# Patient Record
Sex: FEMALE | Race: WHITE | NOT HISPANIC OR LATINO | Employment: OTHER | URBAN - METROPOLITAN AREA
[De-identification: names, ages, dates, MRNs, and addresses within clinical notes are randomized per-mention and may not be internally consistent; named-entity substitution may affect disease eponyms.]

---

## 2022-07-08 ENCOUNTER — APPOINTMENT (OUTPATIENT)
Dept: CARDIOLOGY | Facility: HOSPITAL | Age: 62
End: 2022-07-08

## 2022-07-08 ENCOUNTER — APPOINTMENT (OUTPATIENT)
Dept: GENERAL RADIOLOGY | Facility: HOSPITAL | Age: 62
End: 2022-07-08

## 2022-07-08 ENCOUNTER — HOSPITAL ENCOUNTER (EMERGENCY)
Facility: HOSPITAL | Age: 62
Discharge: HOME | End: 2022-07-08
Attending: EMERGENCY MEDICINE | Admitting: EMERGENCY MEDICINE

## 2022-07-08 VITALS
TEMPERATURE: 98.9 F | DIASTOLIC BLOOD PRESSURE: 96 MMHG | SYSTOLIC BLOOD PRESSURE: 125 MMHG | HEIGHT: 63 IN | OXYGEN SATURATION: 97 % | HEART RATE: 130 BPM | BODY MASS INDEX: 24.8 KG/M2 | WEIGHT: 140 LBS | RESPIRATION RATE: 20 BRPM

## 2022-07-08 DIAGNOSIS — B34.8 RHINOVIRUS INFECTION: Primary | ICD-10-CM

## 2022-07-08 DIAGNOSIS — I47.1 NARROW COMPLEX TACHYCARDIA: ICD-10-CM

## 2022-07-08 LAB
ALBUMIN SERPL-MCNC: 4.4 G/DL (ref 3.5–5.2)
ALBUMIN/GLOB SERPL: 1.7 G/DL
ALP SERPL-CCNC: 67 U/L (ref 39–117)
ALT SERPL W P-5'-P-CCNC: 12 U/L (ref 1–33)
ANION GAP SERPL CALCULATED.3IONS-SCNC: 12.1 MMOL/L (ref 5–15)
AST SERPL-CCNC: 16 U/L (ref 1–32)
B PARAPERT DNA SPEC QL NAA+PROBE: NOT DETECTED
B PERT DNA SPEC QL NAA+PROBE: NOT DETECTED
BASOPHILS # BLD AUTO: 0.05 10*3/MM3 (ref 0–0.2)
BASOPHILS NFR BLD AUTO: 0.5 % (ref 0–1.5)
BILIRUB SERPL-MCNC: 0.4 MG/DL (ref 0–1.2)
BUN SERPL-MCNC: 8 MG/DL (ref 8–23)
BUN/CREAT SERPL: 12.5 (ref 7–25)
C PNEUM DNA NPH QL NAA+NON-PROBE: NOT DETECTED
CALCIUM SPEC-SCNC: 9.2 MG/DL (ref 8.6–10.5)
CHLORIDE SERPL-SCNC: 97 MMOL/L (ref 98–107)
CO2 SERPL-SCNC: 23.9 MMOL/L (ref 22–29)
CREAT SERPL-MCNC: 0.64 MG/DL (ref 0.57–1)
DEPRECATED RDW RBC AUTO: 38.1 FL (ref 37–54)
EGFRCR SERPLBLD CKD-EPI 2021: 98.8 ML/MIN/1.73
EOSINOPHIL # BLD AUTO: 0.05 10*3/MM3 (ref 0–0.4)
EOSINOPHIL NFR BLD AUTO: 0.5 % (ref 0.3–6.2)
ERYTHROCYTE [DISTWIDTH] IN BLOOD BY AUTOMATED COUNT: 12.3 % (ref 12.3–15.4)
FLUAV SUBTYP SPEC NAA+PROBE: NOT DETECTED
FLUBV RNA ISLT QL NAA+PROBE: NOT DETECTED
GLOBULIN UR ELPH-MCNC: 2.6 GM/DL
GLUCOSE SERPL-MCNC: 113 MG/DL (ref 65–99)
HADV DNA SPEC NAA+PROBE: NOT DETECTED
HCOV 229E RNA SPEC QL NAA+PROBE: NOT DETECTED
HCOV HKU1 RNA SPEC QL NAA+PROBE: NOT DETECTED
HCOV NL63 RNA SPEC QL NAA+PROBE: NOT DETECTED
HCOV OC43 RNA SPEC QL NAA+PROBE: NOT DETECTED
HCT VFR BLD AUTO: 38.8 % (ref 34–46.6)
HGB BLD-MCNC: 13.1 G/DL (ref 12–15.9)
HMPV RNA NPH QL NAA+NON-PROBE: NOT DETECTED
HOLD SPECIMEN: NORMAL
HPIV1 RNA ISLT QL NAA+PROBE: NOT DETECTED
HPIV2 RNA SPEC QL NAA+PROBE: NOT DETECTED
HPIV3 RNA NPH QL NAA+PROBE: NOT DETECTED
HPIV4 P GENE NPH QL NAA+PROBE: NOT DETECTED
IMM GRANULOCYTES # BLD AUTO: 0.03 10*3/MM3 (ref 0–0.05)
IMM GRANULOCYTES NFR BLD AUTO: 0.3 % (ref 0–0.5)
LYMPHOCYTES # BLD AUTO: 0.78 10*3/MM3 (ref 0.7–3.1)
LYMPHOCYTES NFR BLD AUTO: 7.9 % (ref 19.6–45.3)
M PNEUMO IGG SER IA-ACNC: NOT DETECTED
MAGNESIUM SERPL-MCNC: 2.1 MG/DL (ref 1.6–2.4)
MCH RBC QN AUTO: 29.4 PG (ref 26.6–33)
MCHC RBC AUTO-ENTMCNC: 33.8 G/DL (ref 31.5–35.7)
MCV RBC AUTO: 87 FL (ref 79–97)
MONOCYTES # BLD AUTO: 0.57 10*3/MM3 (ref 0.1–0.9)
MONOCYTES NFR BLD AUTO: 5.8 % (ref 5–12)
NEUTROPHILS NFR BLD AUTO: 8.34 10*3/MM3 (ref 1.7–7)
NEUTROPHILS NFR BLD AUTO: 85 % (ref 42.7–76)
NRBC BLD AUTO-RTO: 0 /100 WBC (ref 0–0.2)
NT-PROBNP SERPL-MCNC: 310 PG/ML (ref 0–900)
PLATELET # BLD AUTO: 196 10*3/MM3 (ref 140–450)
PMV BLD AUTO: 11.2 FL (ref 6–12)
POTASSIUM SERPL-SCNC: 3.8 MMOL/L (ref 3.5–5.2)
PROT SERPL-MCNC: 7 G/DL (ref 6–8.5)
QT INTERVAL: 311 MS
QT INTERVAL: 360 MS
RBC # BLD AUTO: 4.46 10*6/MM3 (ref 3.77–5.28)
RHINOVIRUS RNA SPEC NAA+PROBE: DETECTED
RSV RNA NPH QL NAA+NON-PROBE: NOT DETECTED
SARS-COV-2 RNA NPH QL NAA+NON-PROBE: NOT DETECTED
SODIUM SERPL-SCNC: 133 MMOL/L (ref 136–145)
TROPONIN T SERPL-MCNC: <0.01 NG/ML (ref 0–0.03)
WBC NRBC COR # BLD: 9.82 10*3/MM3 (ref 3.4–10.8)
WHOLE BLOOD HOLD COAG: NORMAL
WHOLE BLOOD HOLD SPECIMEN: NORMAL

## 2022-07-08 PROCEDURE — 96374 THER/PROPH/DIAG INJ IV PUSH: CPT

## 2022-07-08 PROCEDURE — 93005 ELECTROCARDIOGRAM TRACING: CPT | Performed by: EMERGENCY MEDICINE

## 2022-07-08 PROCEDURE — 84484 ASSAY OF TROPONIN QUANT: CPT | Performed by: EMERGENCY MEDICINE

## 2022-07-08 PROCEDURE — 80053 COMPREHEN METABOLIC PANEL: CPT | Performed by: EMERGENCY MEDICINE

## 2022-07-08 PROCEDURE — 0202U NFCT DS 22 TRGT SARS-COV-2: CPT | Performed by: EMERGENCY MEDICINE

## 2022-07-08 PROCEDURE — 93005 ELECTROCARDIOGRAM TRACING: CPT

## 2022-07-08 PROCEDURE — 93246 EXT ECG>7D<15D RECORDING: CPT

## 2022-07-08 PROCEDURE — 85025 COMPLETE CBC W/AUTO DIFF WBC: CPT | Performed by: EMERGENCY MEDICINE

## 2022-07-08 PROCEDURE — 83735 ASSAY OF MAGNESIUM: CPT | Performed by: EMERGENCY MEDICINE

## 2022-07-08 PROCEDURE — 83880 ASSAY OF NATRIURETIC PEPTIDE: CPT | Performed by: EMERGENCY MEDICINE

## 2022-07-08 PROCEDURE — 25010000002 ONDANSETRON PER 1 MG: Performed by: PHYSICIAN ASSISTANT

## 2022-07-08 PROCEDURE — 96376 TX/PRO/DX INJ SAME DRUG ADON: CPT

## 2022-07-08 PROCEDURE — 93010 ELECTROCARDIOGRAM REPORT: CPT | Performed by: INTERNAL MEDICINE

## 2022-07-08 PROCEDURE — 99284 EMERGENCY DEPT VISIT MOD MDM: CPT

## 2022-07-08 PROCEDURE — 96375 TX/PRO/DX INJ NEW DRUG ADDON: CPT

## 2022-07-08 PROCEDURE — 71045 X-RAY EXAM CHEST 1 VIEW: CPT

## 2022-07-08 RX ORDER — ONDANSETRON 2 MG/ML
4 INJECTION INTRAMUSCULAR; INTRAVENOUS ONCE
Status: COMPLETED | OUTPATIENT
Start: 2022-07-08 | End: 2022-07-08

## 2022-07-08 RX ORDER — ACETAMINOPHEN, ASPIRIN AND CAFFEINE 250; 250; 65 MG/1; MG/1; MG/1
1 TABLET, FILM COATED ORAL EVERY 6 HOURS PRN
COMMUNITY

## 2022-07-08 RX ORDER — METOPROLOL SUCCINATE 25 MG/1
12.5 TABLET, EXTENDED RELEASE ORAL DAILY
Qty: 15 TABLET | Refills: 0 | Status: SHIPPED | OUTPATIENT
Start: 2022-07-08 | End: 2022-07-11 | Stop reason: SDUPTHER

## 2022-07-08 RX ORDER — SODIUM CHLORIDE 0.9 % (FLUSH) 0.9 %
10 SYRINGE (ML) INJECTION AS NEEDED
Status: DISCONTINUED | OUTPATIENT
Start: 2022-07-08 | End: 2022-07-08 | Stop reason: HOSPADM

## 2022-07-08 RX ADMIN — SODIUM CHLORIDE 1000 ML: 9 INJECTION, SOLUTION INTRAVENOUS at 10:52

## 2022-07-08 RX ADMIN — Medication 10 ML: at 10:37

## 2022-07-08 RX ADMIN — METOPROLOL TARTRATE 25 MG: 25 TABLET, FILM COATED ORAL at 11:28

## 2022-07-08 RX ADMIN — METOROPROLOL TARTRATE 5 MG: 5 INJECTION, SOLUTION INTRAVENOUS at 14:07

## 2022-07-08 RX ADMIN — METOPROLOL TARTRATE 5 MG: 1 INJECTION, SOLUTION INTRAVENOUS at 13:01

## 2022-07-08 RX ADMIN — ONDANSETRON 4 MG: 2 INJECTION INTRAMUSCULAR; INTRAVENOUS at 10:52

## 2022-07-08 NOTE — ED NOTES
Pt arrived via PV with c/o palpitations in chest that have been intermittent over the last two weeks with more frequent episodes starting last night. Pt notices more fluttering sensation at rest. Pt also reports she was to be seen at pcp today for cough, headache, and neck pain and was told to come to ER for cardiac workup. Pt denies any cardiac hx.

## 2022-07-08 NOTE — ED NOTES
Pt to ED with c/o mild HA, nausea, HR fluttering, pts HR does elevate to 150's since arrival, states has mild cough with yellow sputum, home neg covid test yesterday     Pt wearing face mask during their stay in ER. This RN wore appropriate ppe while providing patient care.

## 2022-07-08 NOTE — ED PROVIDER NOTES
" EMERGENCY DEPARTMENT ENCOUNTER    Room Number:  22/22  Date of encounter:  7/8/2022  PCP: Provider, No Known  Historian: Patient      HPI:  Chief Complaint: Cough, palpitations, headache and nausea  A complete HPI/ROS/PMH/PSH/SH/FH are unobtainable due to: N/A    Context: Jasmin Ureña is a 62 y.o. female who presents to the ED c/o the above symptoms.  Symptoms started approximately 4 to 5 days ago with a sore throat that has since resolved.  Now she has a cough and congestion-cough is productive of yellow sputum.  No fevers or chills.  No myalgias or arthralgias.  Heart has been racing intermittently which causes him shortness of breath.  She also has a headache and nausea.  Symptoms were gradual in onset, constant, \"aching\", moderate in intensity.  There are no aggravating relieving factors.    Of note-patient has not been vaccinated for COVID-19 or influenza.  She states she was diagnosed with COVID in August 2021.        The patient was placed in a mask in triage, hand hygiene was performed before and after my interaction with the patient.  I wore a mask, safety glasses and gloves during my entire interaction with the patient.    PAST MEDICAL HISTORY  Active Ambulatory Problems     Diagnosis Date Noted   • No Active Ambulatory Problems     Resolved Ambulatory Problems     Diagnosis Date Noted   • No Resolved Ambulatory Problems     Past Medical History:   Diagnosis Date   • Migraine          PAST SURGICAL HISTORY  History reviewed. No pertinent surgical history.      FAMILY HISTORY  History reviewed. No pertinent family history.      SOCIAL HISTORY  Social History     Socioeconomic History   • Marital status:    Tobacco Use   • Smoking status: Never Smoker   Substance and Sexual Activity   • Alcohol use: Yes     Comment: occasionally   • Drug use: Never   • Sexual activity: Defer         ALLERGIES  Patient has no known allergies.        REVIEW OF SYSTEMS  Review of Systems   Constitutional: Negative " for chills and fever.   HENT: Positive for congestion and sore throat (Resolved).    Respiratory: Positive for cough.    Cardiovascular: Positive for palpitations. Negative for chest pain.   Gastrointestinal: Negative for abdominal pain, diarrhea and vomiting.   Neurological: Positive for headaches.        All systems reviewed and negative except for those discussed in HPI.       PHYSICAL EXAM    I have reviewed the triage vital signs and nursing notes.    ED Triage Vitals   Temp Heart Rate Resp BP SpO2   07/08/22 0929 07/08/22 0929 07/08/22 0929 07/08/22 1039 07/08/22 0929   98.9 °F (37.2 °C) 110 18 128/84 98 %      Temp src Heart Rate Source Patient Position BP Location FiO2 (%)   07/08/22 0929 07/08/22 1039 -- -- --   Tympanic Monitor          Physical Exam   Constitutional: Pt. is oriented to person, place, and time and well-developed, well-nourished, and in no distress.   HENT: Normocephalic and atraumatic.   Neck: Normal range of motion. Neck supple. No JVD present.   Cardiovascular: Normal rate, regular rhythm and normal heart sounds. No murmur heard.  Pulmonary/Chest: Effort normal and breath sounds normal. No stridor. No respiratory distress. No wheezes, no rales.   Abdominal: Soft. Bowel sounds are normal. No distension. There is no tenderness. There is no rebound and no guarding.   Musculoskeletal: Normal range of motion. No edema, tenderness or deformity.   Neurological: Pt. is alert and oriented to person, place, and time.  She has no focal neurologic deficits  Skin: Skin is warm and dry. No rash noted. Pt. is not diaphoretic. No erythema.   Psychiatric: Mood, affect normal.  She is pleasant and cooperative.  Nursing note and vitals reviewed.        LAB RESULTS  Recent Results (from the past 24 hour(s))   ECG 12 Lead    Collection Time: 07/08/22  9:50 AM   Result Value Ref Range    QT Interval 360 ms   Comprehensive Metabolic Panel    Collection Time: 07/08/22 10:37 AM    Specimen: Blood   Result Value  Ref Range    Glucose 113 (H) 65 - 99 mg/dL    BUN 8 8 - 23 mg/dL    Creatinine 0.64 0.57 - 1.00 mg/dL    Sodium 133 (L) 136 - 145 mmol/L    Potassium 3.8 3.5 - 5.2 mmol/L    Chloride 97 (L) 98 - 107 mmol/L    CO2 23.9 22.0 - 29.0 mmol/L    Calcium 9.2 8.6 - 10.5 mg/dL    Total Protein 7.0 6.0 - 8.5 g/dL    Albumin 4.40 3.50 - 5.20 g/dL    ALT (SGPT) 12 1 - 33 U/L    AST (SGOT) 16 1 - 32 U/L    Alkaline Phosphatase 67 39 - 117 U/L    Total Bilirubin 0.4 0.0 - 1.2 mg/dL    Globulin 2.6 gm/dL    A/G Ratio 1.7 g/dL    BUN/Creatinine Ratio 12.5 7.0 - 25.0    Anion Gap 12.1 5.0 - 15.0 mmol/L    eGFR 98.8 >60.0 mL/min/1.73   Magnesium    Collection Time: 07/08/22 10:37 AM    Specimen: Blood   Result Value Ref Range    Magnesium 2.1 1.6 - 2.4 mg/dL   Troponin    Collection Time: 07/08/22 10:37 AM    Specimen: Blood   Result Value Ref Range    Troponin T <0.010 0.000 - 0.030 ng/mL   Green Top (Gel)    Collection Time: 07/08/22 10:37 AM   Result Value Ref Range    Extra Tube Hold for add-ons.    Lavender Top    Collection Time: 07/08/22 10:37 AM   Result Value Ref Range    Extra Tube hold for add-on    Light Blue Top    Collection Time: 07/08/22 10:37 AM   Result Value Ref Range    Extra Tube Hold for add-ons.    CBC Auto Differential    Collection Time: 07/08/22 10:37 AM    Specimen: Blood   Result Value Ref Range    WBC 9.82 3.40 - 10.80 10*3/mm3    RBC 4.46 3.77 - 5.28 10*6/mm3    Hemoglobin 13.1 12.0 - 15.9 g/dL    Hematocrit 38.8 34.0 - 46.6 %    MCV 87.0 79.0 - 97.0 fL    MCH 29.4 26.6 - 33.0 pg    MCHC 33.8 31.5 - 35.7 g/dL    RDW 12.3 12.3 - 15.4 %    RDW-SD 38.1 37.0 - 54.0 fl    MPV 11.2 6.0 - 12.0 fL    Platelets 196 140 - 450 10*3/mm3    Neutrophil % 85.0 (H) 42.7 - 76.0 %    Lymphocyte % 7.9 (L) 19.6 - 45.3 %    Monocyte % 5.8 5.0 - 12.0 %    Eosinophil % 0.5 0.3 - 6.2 %    Basophil % 0.5 0.0 - 1.5 %    Immature Grans % 0.3 0.0 - 0.5 %    Neutrophils, Absolute 8.34 (H) 1.70 - 7.00 10*3/mm3    Lymphocytes,  Absolute 0.78 0.70 - 3.10 10*3/mm3    Monocytes, Absolute 0.57 0.10 - 0.90 10*3/mm3    Eosinophils, Absolute 0.05 0.00 - 0.40 10*3/mm3    Basophils, Absolute 0.05 0.00 - 0.20 10*3/mm3    Immature Grans, Absolute 0.03 0.00 - 0.05 10*3/mm3    nRBC 0.0 0.0 - 0.2 /100 WBC   BNP    Collection Time: 07/08/22 10:37 AM    Specimen: Blood   Result Value Ref Range    proBNP 310.0 0.0 - 900.0 pg/mL   Respiratory Panel PCR w/COVID-19(SARS-CoV-2) BORIS/LUIS ALBERTO/EILEEN/PAD/COR/MAD/SUKH In-House, NP Swab in UTM/VTM, 3-4 HR TAT - Swab, Nasopharynx    Collection Time: 07/08/22 11:29 AM    Specimen: Nasopharynx; Swab   Result Value Ref Range    ADENOVIRUS, PCR Not Detected Not Detected    Coronavirus 229E Not Detected Not Detected    Coronavirus HKU1 Not Detected Not Detected    Coronavirus NL63 Not Detected Not Detected    Coronavirus OC43 Not Detected Not Detected    COVID19 Not Detected Not Detected - Ref. Range    Human Metapneumovirus Not Detected Not Detected    Human Rhinovirus/Enterovirus Detected (A) Not Detected    Influenza A PCR Not Detected Not Detected    Influenza B PCR Not Detected Not Detected    Parainfluenza Virus 1 Not Detected Not Detected    Parainfluenza Virus 2 Not Detected Not Detected    Parainfluenza Virus 3 Not Detected Not Detected    Parainfluenza Virus 4 Not Detected Not Detected    RSV, PCR Not Detected Not Detected    Bordetella pertussis pcr Not Detected Not Detected    Bordetella parapertussis PCR Not Detected Not Detected    Chlamydophila pneumoniae PCR Not Detected Not Detected    Mycoplasma pneumo by PCR Not Detected Not Detected   ECG 12 Lead    Collection Time: 07/08/22 12:23 PM   Result Value Ref Range    QT Interval 311 ms       Ordered the above labs and independently reviewed the results.        RADIOLOGY  XR Chest 1 View    Result Date: 7/8/2022  XR CHEST 1 VW-  Clinical: Dysrhythmia  COMPARISON: None  FINDINGS: Cardiac size within normal limits. No mediastinal or hilar abnormality. Lungs clear.   CONCLUSION: Normal chest  This report was finalized on 7/8/2022 10:00 AM by Dr. Juice Price M.D.        I ordered the above noted radiological studies. Reviewed by me and discussed with radiologist.  See dictation for official radiology interpretation.      PROCEDURES    Procedures      MEDICATIONS GIVEN IN ER    Medications   sodium chloride 0.9 % flush 10 mL (10 mL Intravenous Given 7/8/22 1037)   sodium chloride 0.9 % bolus 1,000 mL (0 mL Intravenous Stopped 7/8/22 1129)   ondansetron (ZOFRAN) injection 4 mg (4 mg Intravenous Given 7/8/22 1052)   metoprolol tartrate (LOPRESSOR) tablet 25 mg (25 mg Oral Given 7/8/22 1128)   metoprolol tartrate (LOPRESSOR) injection 5 mg (5 mg Intravenous Given 7/8/22 1301)   metoprolol tartrate (LOPRESSOR) injection 5 mg (5 mg Intravenous Given 7/8/22 1407)         PROGRESS, DATA ANALYSIS, CONSULTS, AND MEDICAL DECISION MAKING    Any/all labs have been independently reviewed by me.  Any/all radiology studies have been reviewed by me and discussed with radiologist dictating the report.   EKG's independently viewed and interpreted by me.  Discussion below represents my analysis of pertinent findings related to patient's condition, differential diagnosis, treatment plan and final disposition.    Number of Diagnoses or Management Options     Amount and/or Complexity of Data Reviewed  Clinical lab tests:  Yes  Tests in the radiology section of CPT®:  Yes  Tests in the medicine section of CPT®:  Yes  Review and summarize past medical records:  (Yes-no recent ER/hospitalizations at [facility])  Independent visualization of images, tracings, or specimens: (Yes-see below)      ED Course as of 07/08/22 1426   Fri Jul 08, 2022   1105 EKG performed today at 915 interpreted by me shows normal sinus rhythm with a heart rate of 97 bpm.  There is left atrial enlargement..  VA intervals are normal.  There are anterior Q waves and nonspecific ST-T changes.  There are no prior EKGs  available for comparison. [WC]   1105 Chest x-ray independently visualized by me and discussed with/interpreted by Dr. Price (radiology)-there are no acute processes identified.  For official interpretation, see dictated report. [WC]   1106 Differential diagnosis includes but is not limited to: Pulmonary embolism, aortic dissection, acute coronary syndrome/STEMI, pneumonia/CHF/COPD exacerbation, pneumothorax, pleurisy, bronchitis, gastroesophageal reflux, referred pain, traumatic injury/rib fractures, etc. [WC]   1106 Cardiac monitor revealed normal sinus as interpreted by me.  Cardiac monitoring was ordered secondary to the patient's history of chest discomfort and to monitor the patient for dysrhythmia.    She is having episodes of narrow complex tachycardia with a heart rate in the 140s to 150s on the monitor. [WC]   1143 Troponin T: <0.010  Normal [WC]   1143 CBC is unremarkable [WC]   1225 CMP is unremarkable [WC]   1225 Repeat EKG performed at 1223 and interpreted by me shows narrow complex tachycardia (atrial flutter?)  With a heart rate of 145 bpm.  Q waves noted in V1.  Nonspecific ST-T changes are present. [WC]   1245 proBNP: 310.0 [WC]   1405 Human Rhinovirus/Enterovirus(!): Detected [WC]   1424 Patient's rate has settled down and she feels much better.  I am going to prescribe her a low-dose of metoprolol.  Zio patch will be placed-she will need to follow-up with cardiology as an outpatient. [WC]      ED Course User Index  [WC] Edwardo Eduardo MD       AS OF 14:26 EDT VITALS:    BP - 125/96  HR - (!) 130  TEMP - 98.9 °F (37.2 °C) (Tympanic)  02 SATS - 97%        DIAGNOSIS  Final diagnoses:   Rhinovirus infection   Narrow complex tachycardia (HCC)         DISPOSITION  Discharged           Edwardo Eduardo MD  07/08/22 1426

## 2022-07-11 ENCOUNTER — OFFICE VISIT (OUTPATIENT)
Dept: INTERNAL MEDICINE | Facility: CLINIC | Age: 62
End: 2022-07-11

## 2022-07-11 VITALS
DIASTOLIC BLOOD PRESSURE: 74 MMHG | HEART RATE: 78 BPM | BODY MASS INDEX: 24.98 KG/M2 | WEIGHT: 141 LBS | RESPIRATION RATE: 18 BRPM | HEIGHT: 63 IN | SYSTOLIC BLOOD PRESSURE: 134 MMHG | TEMPERATURE: 97.5 F | OXYGEN SATURATION: 98 %

## 2022-07-11 DIAGNOSIS — G43.009 MIGRAINE WITHOUT AURA AND WITHOUT STATUS MIGRAINOSUS, NOT INTRACTABLE: ICD-10-CM

## 2022-07-11 DIAGNOSIS — Z11.59 NEED FOR HEPATITIS C SCREENING TEST: ICD-10-CM

## 2022-07-11 DIAGNOSIS — Z13.6 ENCOUNTER FOR LIPID SCREENING FOR CARDIOVASCULAR DISEASE: ICD-10-CM

## 2022-07-11 DIAGNOSIS — Z91.89 AT RISK FOR SLEEP APNEA: ICD-10-CM

## 2022-07-11 DIAGNOSIS — R00.2 PALPITATIONS: ICD-10-CM

## 2022-07-11 DIAGNOSIS — Z13.220 ENCOUNTER FOR LIPID SCREENING FOR CARDIOVASCULAR DISEASE: ICD-10-CM

## 2022-07-11 DIAGNOSIS — Z76.89 ENCOUNTER TO ESTABLISH CARE: Primary | ICD-10-CM

## 2022-07-11 DIAGNOSIS — Z00.00 HEALTHCARE MAINTENANCE: ICD-10-CM

## 2022-07-11 DIAGNOSIS — Z12.31 SCREENING MAMMOGRAM, ENCOUNTER FOR: ICD-10-CM

## 2022-07-11 PROCEDURE — 99204 OFFICE O/P NEW MOD 45 MIN: CPT | Performed by: NURSE PRACTITIONER

## 2022-07-11 RX ORDER — METOPROLOL SUCCINATE 25 MG/1
12.5 TABLET, EXTENDED RELEASE ORAL DAILY
Qty: 45 TABLET | Refills: 0 | Status: SHIPPED | OUTPATIENT
Start: 2022-07-11 | End: 2022-07-27 | Stop reason: SDUPTHER

## 2022-07-11 NOTE — PROGRESS NOTES
"Chief Complaint  Follow-up (Pt present to us for a wellness check as well as a hospital follow up )    Subjective        Jasmin Ureña presents to [facility]  Patient presents to [facility] care today.  This is a 62-year-old female.  Her former PCP was Dr. Singh with [facility].  This patient is new to me.  She is a retired respiratory therapist.    She was in the ER on 7/8/2022, 3 days ago.  She had been having some respiratory symptoms that were gradually improving but has been having palpitations.  She presented for evaluation of the palpitations.  She was positive for rhinovirus but reports that her respiratory symptoms have since resolved.  No shortness of breath.  She had an EKG and a Zio patch was placed which she is wearing now.  She plans to see cardiology outpatient per the instructions from the ER but does not have the correct phone number and would like me to place the referral today so that she is contacted to schedule.    She has been monitoring her home blood pressures since the ER visit.  They are running in the 1 teens to 130s over 70s.  Her heart rate at home has been running between 60s and 80s.    She is a never smoker and drinks alcohol socially, not in excess.    She sees no specialists other than a gynecologist.  It has been a few years since she saw GYN.  She is due for screening mammogram.    She endorses a family history significant for CAD and sleep apnea.  She has a history of migraines but lately she has been getting migraines that begin at night.  She wonders whether this may be related to sleep apnea.    Otherwise she denies development of other new issues today.      Objective   Vital Signs:  /74 Comment: Manual recheck by APRN  Pulse 78   Temp 97.5 °F (36.4 °C)   Resp 18   Ht 160 cm (63\")   Wt 64 kg (141 lb)   SpO2 98%   BMI 24.98 kg/m²   Estimated body mass index is 24.98 kg/m² as calculated from the following:    Height as of this " "encounter: 160 cm (63\").    Weight as of this encounter: 64 kg (141 lb).    BMI is within normal parameters. No other follow-up for BMI required.      Physical Exam  Vitals and nursing note reviewed.   Constitutional:       General: She is not in acute distress.     Appearance: Normal appearance. She is well-developed. She is not ill-appearing, toxic-appearing or diaphoretic.   HENT:      Head: Normocephalic and atraumatic.      Right Ear: Tympanic membrane, ear canal and external ear normal.      Left Ear: Tympanic membrane, ear canal and external ear normal.   Eyes:      Pupils: Pupils are equal, round, and reactive to light.   Neck:      Vascular: No carotid bruit.   Cardiovascular:      Rate and Rhythm: Normal rate and regular rhythm.      Pulses: Normal pulses.      Heart sounds: Normal heart sounds.      Comments: No peripheral edema  Pulmonary:      Effort: Pulmonary effort is normal. No respiratory distress.      Breath sounds: Normal breath sounds. No stridor. No wheezing, rhonchi or rales.   Chest:      Chest wall: No tenderness.   Abdominal:      General: Bowel sounds are normal. There is no distension.      Palpations: Abdomen is soft. There is no mass.      Tenderness: There is no abdominal tenderness. There is no right CVA tenderness, left CVA tenderness, guarding or rebound.      Hernia: No hernia is present.   Musculoskeletal:         General: Normal range of motion.      Cervical back: Normal range of motion and neck supple. No rigidity or tenderness.   Lymphadenopathy:      Cervical: No cervical adenopathy.   Skin:     General: Skin is warm and dry.      Capillary Refill: Capillary refill takes less than 2 seconds.   Neurological:      General: No focal deficit present.      Mental Status: She is alert and oriented to person, place, and time. Mental status is at baseline.   Psychiatric:         Mood and Affect: Mood normal.         Behavior: Behavior normal.         Thought Content: Thought content " normal.         Judgment: Judgment normal.        Result Review :  The following data was reviewed by: SAADIA Kimball on 07/11/2022:  Common labs    Common Labsle 7/8/22 7/8/22    1037 1037   Glucose  113 (A)   BUN  8   Creatinine  0.64   Sodium  133 (A)   Potassium  3.8   Chloride  97 (A)   Calcium  9.2   Albumin  4.40   Total Bilirubin  0.4   Alkaline Phosphatase  67   AST (SGOT)  16   ALT (SGPT)  12   WBC 9.82    Hemoglobin 13.1    Hematocrit 38.8    Platelets 196    (A) Abnormal value            Current outpatient and discharge medications have been reconciled for the patient.  Reviewed by: SAADIA Kimball           Assessment and Plan   Diagnoses and all orders for this visit:    1. Encounter to establish care (Primary)  Comments:  Reviewed medical, surgical and social history.    2. Encounter for lipid screening for cardiovascular disease  Comments:  Fasting lipid panel today.  Orders:  -     Lipid panel    3. Palpitations  Comments:  She will turn in Zio patch soon.  Continue Toprol-XL 12.5 mg daily.  TSH, free T4 today.  Referral to cardiology is placed.  Orders:  -     Ambulatory Referral to Cardiology  -     TSH Rfx On Abnormal To Free T4  -     metoprolol succinate XL (TOPROL-XL) 25 MG 24 hr tablet; Take 0.5 tablets by mouth Daily.  Dispense: 45 tablet; Refill: 0    4. Need for hepatitis C screening test  Comments:  Discussed with patient, screening hep C is ordered.  Orders:  -     Hepatitis C antibody    5. Screening mammogram, encounter for  Comments:  Due for screening mammogram, order is placed after discussion.  Orders:  -     Mammo Screening Bilateral With CAD; Future    6. Migraine without aura and without status migrainosus, not intractable  Assessment & Plan:  Headaches are stable overall but she has been getting some headaches at night.  I am going to place a referral for sleep medicine to see if sleep apnea is contributing.  She is encouraged to notify me if her migraines ever  worsen.          7. At risk for sleep apnea  Comments:  Referral to sleep medicine is placed.  Orders:  -     Ambulatory Referral to Sleep Medicine    8. Healthcare maintenance  Assessment & Plan:  Screening mammogram orders are placed.    She is encouraged to make a follow-up appointment with her gynecologist, Dr. Huitron for routine Pap smear/pelvic exam.           We will contact patient with lab results and any further recommendations.  Follow-up as needed and I will see her back in about 6 months for a physical.    Follow Up {Instructions Charge Capture  Follow-up Communications :23}  Return in about 6 months (around 1/11/2023) for Annual physical.  Patient was given instructions and counseling regarding her condition or for health maintenance advice. Please see specific information pulled into the AVS if appropriate.

## 2022-07-11 NOTE — ASSESSMENT & PLAN NOTE
Screening mammogram orders are placed.    She is encouraged to make a follow-up appointment with her gynecologist, Dr. Huitron for routine Pap smear/pelvic exam.

## 2022-07-11 NOTE — ASSESSMENT & PLAN NOTE
Headaches are stable overall but she has been getting some headaches at night.  I am going to place a referral for sleep medicine to see if sleep apnea is contributing.  She is encouraged to notify me if her migraines ever worsen.

## 2022-07-12 LAB
CHOLEST SERPL-MCNC: 203 MG/DL (ref 100–199)
HCV AB S/CO SERPL IA: <0.1 S/CO RATIO (ref 0–0.9)
HDLC SERPL-MCNC: 52 MG/DL
LDLC SERPL CALC-MCNC: 140 MG/DL (ref 0–99)
TRIGL SERPL-MCNC: 60 MG/DL (ref 0–149)
TSH SERPL DL<=0.005 MIU/L-ACNC: 1.37 UIU/ML (ref 0.45–4.5)
VLDLC SERPL CALC-MCNC: 11 MG/DL (ref 5–40)

## 2022-07-14 ENCOUNTER — PATIENT ROUNDING (BHMG ONLY) (OUTPATIENT)
Dept: INTERNAL MEDICINE | Facility: CLINIC | Age: 62
End: 2022-07-14

## 2022-07-15 NOTE — PROGRESS NOTES
Good morning Ms. Ureña, lab work is back.  Total and LDL cholesterol are both a bit elevated, please continue with regular daily exercise and limit intake of fats in the diet.  We will recheck routinely, goal is to have the LDL less than 100.  Thyroid numbers are stable along with hepatitis C screening.  Please a me know if you have any questions and have a great day,    SAADIA Kimball

## 2022-07-25 ENCOUNTER — HOSPITAL ENCOUNTER (OUTPATIENT)
Dept: MAMMOGRAPHY | Facility: HOSPITAL | Age: 62
Discharge: HOME | End: 2022-07-25
Admitting: NURSE PRACTITIONER

## 2022-07-25 DIAGNOSIS — Z12.31 SCREENING MAMMOGRAM, ENCOUNTER FOR: ICD-10-CM

## 2022-07-25 PROCEDURE — 77063 BREAST TOMOSYNTHESIS BI: CPT

## 2022-07-25 PROCEDURE — 77067 SCR MAMMO BI INCL CAD: CPT

## 2022-07-25 NOTE — PROGRESS NOTES
Good morning, your mammogram is back and stable with no evidence of malignancy according to the radiologist.  I recommend a routine repeat screening mammogram in 1 year.

## 2022-07-27 ENCOUNTER — OFFICE VISIT (OUTPATIENT)
Dept: SLEEP MEDICINE | Facility: HOSPITAL | Age: 62
End: 2022-07-27

## 2022-07-27 VITALS
BODY MASS INDEX: 24.98 KG/M2 | DIASTOLIC BLOOD PRESSURE: 67 MMHG | WEIGHT: 141 LBS | OXYGEN SATURATION: 99 % | SYSTOLIC BLOOD PRESSURE: 136 MMHG | HEART RATE: 70 BPM | HEIGHT: 63 IN

## 2022-07-27 DIAGNOSIS — G47.30 SLEEP-DISORDERED BREATHING: Primary | ICD-10-CM

## 2022-07-27 DIAGNOSIS — R00.2 PALPITATIONS: ICD-10-CM

## 2022-07-27 DIAGNOSIS — Z91.89 AT RISK FOR SLEEP APNEA: ICD-10-CM

## 2022-07-27 PROCEDURE — 99244 OFF/OP CNSLTJ NEW/EST MOD 40: CPT | Performed by: INTERNAL MEDICINE

## 2022-07-27 PROCEDURE — G0463 HOSPITAL OUTPT CLINIC VISIT: HCPCS

## 2022-07-27 RX ORDER — METOPROLOL SUCCINATE 25 MG/1
12.5 TABLET, EXTENDED RELEASE ORAL DAILY
Qty: 45 TABLET | Refills: 0 | Status: SHIPPED | OUTPATIENT
Start: 2022-07-27 | End: 2022-07-28 | Stop reason: SDUPTHER

## 2022-07-28 DIAGNOSIS — R00.2 PALPITATIONS: ICD-10-CM

## 2022-07-28 RX ORDER — METOPROLOL SUCCINATE 25 MG/1
12.5 TABLET, EXTENDED RELEASE ORAL DAILY
Qty: 45 TABLET | Refills: 0 | Status: SHIPPED | OUTPATIENT
Start: 2022-07-28 | End: 2022-11-17 | Stop reason: SDUPTHER

## 2022-08-02 PROBLEM — G47.30 SLEEP-DISORDERED BREATHING: Status: ACTIVE | Noted: 2022-08-02

## 2022-08-03 LAB
MAXIMAL PREDICTED HEART RATE: 156 BPM
STRESS TARGET HR: 133 BPM

## 2022-08-03 PROCEDURE — 93248 EXT ECG>7D<15D REV&INTERPJ: CPT | Performed by: INTERNAL MEDICINE

## 2022-09-07 ENCOUNTER — OFFICE VISIT (OUTPATIENT)
Dept: CARDIOLOGY | Facility: CLINIC | Age: 62
End: 2022-09-07

## 2022-09-07 VITALS
HEART RATE: 72 BPM | SYSTOLIC BLOOD PRESSURE: 134 MMHG | HEIGHT: 63 IN | BODY MASS INDEX: 25.16 KG/M2 | WEIGHT: 142 LBS | DIASTOLIC BLOOD PRESSURE: 80 MMHG

## 2022-09-07 DIAGNOSIS — R06.09 DYSPNEA ON EXERTION: ICD-10-CM

## 2022-09-07 DIAGNOSIS — R00.2 PALPITATIONS: Primary | ICD-10-CM

## 2022-09-07 PROCEDURE — 99204 OFFICE O/P NEW MOD 45 MIN: CPT | Performed by: INTERNAL MEDICINE

## 2022-09-07 PROCEDURE — 93000 ELECTROCARDIOGRAM COMPLETE: CPT | Performed by: INTERNAL MEDICINE

## 2022-09-08 NOTE — PROGRESS NOTES
"      CARDIOLOGY    Lobo Bonner MD    ENCOUNTER DATE:  09/07/2022    Jasmin Ureña / 62 y.o. / female        CHIEF COMPLAINT / REASON FOR OFFICE VISIT     Palpitations and Rapid Heart Rate      HISTORY OF PRESENT ILLNESS       HPI  Jasmin Ureña is a 62 y.o. female who presents today for consultation.  Patient's been having episodes of palpitations.  She said it started about 6 months ago and first started at rest.  She says its been increasing in frequency.  She is very active hikes and bikes a lot.  She said the palpitations have been worsening so she subsequent was evaluated emergency room.  Patient was placed on a beta-blocker and follows up today for further evaluation.  Patient does say that since initiation of beta-blocker her symptoms are now tolerable but they are still occurring.      The following portions of the patient's history were reviewed and updated as appropriate: allergies, current medications, past family history, past medical history, past social history, past surgical history and problem list.      VITAL SIGNS     Visit Vitals  /80 (BP Location: Left arm)   Pulse 72   Ht 160 cm (63\")   Wt 64.4 kg (142 lb)   BMI 25.15 kg/m²         Wt Readings from Last 3 Encounters:   09/07/22 64.4 kg (142 lb)   07/27/22 64 kg (141 lb)   07/11/22 64 kg (141 lb)     Body mass index is 25.15 kg/m².      REVIEW OF SYSTEMS   ROS        PHYSICAL EXAMINATION     Constitutional:       Appearance: Healthy appearance.   Pulmonary:      Effort: Pulmonary effort is normal.   Cardiovascular:      PMI at left midclavicular line. Normal rate. Regular rhythm. Normal S1. Normal S2.      Murmurs: There is no murmur.      No gallop. No click. No rub.   Pulses:     Intact distal pulses.   Edema:     Peripheral edema absent.   Neurological:      Mental Status: Alert and oriented to person, place and time.           REVIEWED DATA       ECG 12 Lead    Date/Time: 9/7/2022 9:00 AM  Performed by: Lobo Bonner " MD LILLIAM  Authorized by: Lobo Bonner MD   Comparison: compared with previous ECG from 7/8/2022  Comparison to previous ECG: Tachycardia  Rhythm: sinus rhythm  Other findings: poor R wave progression    Clinical impression: abnormal EKG            Cardiac Procedures:  1.     Lipid Panel    Lipid Panel 7/11/22   Total Cholesterol 203 (A)   Triglycerides 60   HDL Cholesterol 52   VLDL Cholesterol 11   LDL Cholesterol  140 (A)   (A) Abnormal value                ASSESSMENT & PLAN      Diagnosis Plan   1. Palpitations  Treadmill Stress Test    Adult Transthoracic Echo Complete W/ Cont if Necessary Per Protocol   2. Dyspnea on exertion  Treadmill Stress Test    Adult Transthoracic Echo Complete W/ Cont if Necessary Per Protocol         SUMMARY/DISCUSSION  1. Patient palpitations.  ECG done in emergency room shows a fast rhythm.  There is artifact I think that is where the computer is calling atrial flutter I do not think it is atrial flutter.  In either case she is currently on aspirin as well as metoprolol and doing well.  There is set up for an echocardiogram to rule out structural heart disease as well as put her on a treadmill to see how she does.  She is also set up for home sleep study on 9/12/2022.  A lot of her symptoms could be secondary to sleep apnea if she has it.  We will await the studies and go from there.        MEDICATIONS         Discharge Medications          Accurate as of September 7, 2022 11:59 PM. If you have any questions, ask your nurse or doctor.            Continue These Medications      Instructions Start Date   aspirin-acetaminophen-caffeine 250-250-65 MG per tablet  Commonly known as: EXCEDRIN MIGRAINE   1 tablet, Oral, Every 6 Hours PRN      metoprolol succinate XL 25 MG 24 hr tablet  Commonly known as: TOPROL-XL   12.5 mg, Oral, Daily                 **Dragon Disclaimer:   Much of this encounter note is an electronic transcription/translation of spoken language to printed text. The  electronic translation of spoken language may permit erroneous, or at times, nonsensical words or phrases to be inadvertently transcribed. Although I have reviewed the note for such errors, some may still exist.

## 2022-09-12 ENCOUNTER — HOSPITAL ENCOUNTER (OUTPATIENT)
Dept: SLEEP MEDICINE | Facility: HOSPITAL | Age: 62
Discharge: HOME | End: 2022-09-12
Admitting: INTERNAL MEDICINE

## 2022-09-12 DIAGNOSIS — G47.30 SLEEP-DISORDERED BREATHING: ICD-10-CM

## 2022-09-12 PROCEDURE — 95806 SLEEP STUDY UNATT&RESP EFFT: CPT | Performed by: INTERNAL MEDICINE

## 2022-09-12 PROCEDURE — 95806 SLEEP STUDY UNATT&RESP EFFT: CPT

## 2022-09-26 ENCOUNTER — HOSPITAL ENCOUNTER (OUTPATIENT)
Dept: CARDIOLOGY | Facility: HOSPITAL | Age: 62
Discharge: HOME | End: 2022-09-26
Admitting: INTERNAL MEDICINE

## 2022-09-26 VITALS
BODY MASS INDEX: 25.16 KG/M2 | HEART RATE: 66 BPM | WEIGHT: 141.98 LBS | SYSTOLIC BLOOD PRESSURE: 130 MMHG | HEIGHT: 63 IN | DIASTOLIC BLOOD PRESSURE: 65 MMHG

## 2022-09-26 DIAGNOSIS — R00.2 PALPITATIONS: ICD-10-CM

## 2022-09-26 DIAGNOSIS — R06.09 DYSPNEA ON EXERTION: ICD-10-CM

## 2022-09-26 LAB
AORTIC ARCH: 1.9 CM
ASCENDING AORTA: 2.9 CM
BH CV ECHO MEAS - ACS: 1.86 CM
BH CV ECHO MEAS - AO MAX PG: 6.4 MMHG
BH CV ECHO MEAS - AO MEAN PG: 3.7 MMHG
BH CV ECHO MEAS - AO ROOT DIAM: 2.8 CM
BH CV ECHO MEAS - AO V2 MAX: 126 CM/SEC
BH CV ECHO MEAS - AO V2 VTI: 31.9 CM
BH CV ECHO MEAS - AVA(I,D): 1.96 CM2
BH CV ECHO MEAS - EDV(CUBED): 50.7 ML
BH CV ECHO MEAS - EDV(MOD-SP2): 66 ML
BH CV ECHO MEAS - EDV(MOD-SP4): 74 ML
BH CV ECHO MEAS - EF(MOD-BP): 60.2 %
BH CV ECHO MEAS - EF(MOD-SP2): 56.1 %
BH CV ECHO MEAS - EF(MOD-SP4): 59.5 %
BH CV ECHO MEAS - ESV(CUBED): 13.8 ML
BH CV ECHO MEAS - ESV(MOD-SP2): 29 ML
BH CV ECHO MEAS - ESV(MOD-SP4): 30 ML
BH CV ECHO MEAS - FS: 35.2 %
BH CV ECHO MEAS - IVS/LVPW: 0.96 CM
BH CV ECHO MEAS - IVSD: 0.9 CM
BH CV ECHO MEAS - LAT PEAK E' VEL: 9.1 CM/SEC
BH CV ECHO MEAS - LV DIASTOLIC VOL/BSA (35-75): 44.3 CM2
BH CV ECHO MEAS - LV MASS(C)D: 99.9 GRAMS
BH CV ECHO MEAS - LV MAX PG: 3.2 MMHG
BH CV ECHO MEAS - LV MEAN PG: 1.68 MMHG
BH CV ECHO MEAS - LV SYSTOLIC VOL/BSA (12-30): 17.9 CM2
BH CV ECHO MEAS - LV V1 MAX: 89.1 CM/SEC
BH CV ECHO MEAS - LV V1 VTI: 20.7 CM
BH CV ECHO MEAS - LVIDD: 3.7 CM
BH CV ECHO MEAS - LVIDS: 2.4 CM
BH CV ECHO MEAS - LVOT AREA: 3 CM2
BH CV ECHO MEAS - LVOT DIAM: 1.96 CM
BH CV ECHO MEAS - LVPWD: 0.94 CM
BH CV ECHO MEAS - MED PEAK E' VEL: 7 CM/SEC
BH CV ECHO MEAS - MV A DUR: 0.14 SEC
BH CV ECHO MEAS - MV A MAX VEL: 63.8 CM/SEC
BH CV ECHO MEAS - MV DEC SLOPE: 385.5 CM/SEC2
BH CV ECHO MEAS - MV DEC TIME: 0.18 MSEC
BH CV ECHO MEAS - MV E MAX VEL: 57 CM/SEC
BH CV ECHO MEAS - MV E/A: 0.89
BH CV ECHO MEAS - MV MAX PG: 3 MMHG
BH CV ECHO MEAS - MV MEAN PG: 1.33 MMHG
BH CV ECHO MEAS - MV P1/2T: 61.9 MSEC
BH CV ECHO MEAS - MV V2 VTI: 25.6 CM
BH CV ECHO MEAS - MVA(P1/2T): 3.6 CM2
BH CV ECHO MEAS - MVA(VTI): 2.44 CM2
BH CV ECHO MEAS - PA ACC TIME: 0.14 SEC
BH CV ECHO MEAS - PA PR(ACCEL): 15.6 MMHG
BH CV ECHO MEAS - PA V2 MAX: 77.1 CM/SEC
BH CV ECHO MEAS - PULM A REVS DUR: 0.17 SEC
BH CV ECHO MEAS - PULM A REVS VEL: 24.9 CM/SEC
BH CV ECHO MEAS - PULM DIAS VEL: 48.8 CM/SEC
BH CV ECHO MEAS - PULM S/D: 1.36
BH CV ECHO MEAS - PULM SYS VEL: 66.4 CM/SEC
BH CV ECHO MEAS - RAP SYSTOLE: 3 MMHG
BH CV ECHO MEAS - RV MAX PG: 0.9 MMHG
BH CV ECHO MEAS - RV V1 MAX: 47.6 CM/SEC
BH CV ECHO MEAS - RV V1 VTI: 12 CM
BH CV ECHO MEAS - RVSP: 23.2 MMHG
BH CV ECHO MEAS - SI(MOD-SP2): 22.1 ML/M2
BH CV ECHO MEAS - SI(MOD-SP4): 26.3 ML/M2
BH CV ECHO MEAS - SUP REN AO DIAM: 1.9 CM
BH CV ECHO MEAS - SV(LVOT): 62.6 ML
BH CV ECHO MEAS - SV(MOD-SP2): 37 ML
BH CV ECHO MEAS - SV(MOD-SP4): 44 ML
BH CV ECHO MEAS - TAPSE (>1.6): 2.2 CM
BH CV ECHO MEAS - TR MAX PG: 20.2 MMHG
BH CV ECHO MEAS - TR MAX VEL: 224.8 CM/SEC
BH CV ECHO MEASUREMENTS AVERAGE E/E' RATIO: 7.08
BH CV STRESS BP STAGE 1: NORMAL
BH CV STRESS BP STAGE 2: NORMAL
BH CV STRESS BP STAGE 3: NORMAL
BH CV STRESS DURATION MIN STAGE 1: 3
BH CV STRESS DURATION MIN STAGE 2: 3
BH CV STRESS DURATION MIN STAGE 3: 3
BH CV STRESS DURATION SEC STAGE 1: 0
BH CV STRESS DURATION SEC STAGE 2: 0
BH CV STRESS DURATION SEC STAGE 3: 0
BH CV STRESS GRADE STAGE 1: 10
BH CV STRESS GRADE STAGE 2: 12
BH CV STRESS GRADE STAGE 3: 14
BH CV STRESS HR STAGE 1: 130
BH CV STRESS HR STAGE 2: 150
BH CV STRESS HR STAGE 3: 167
BH CV STRESS METS STAGE 1: 5
BH CV STRESS METS STAGE 2: 7.5
BH CV STRESS METS STAGE 3: 10
BH CV STRESS PROTOCOL 1: NORMAL
BH CV STRESS RECOVERY BP: NORMAL MMHG
BH CV STRESS RECOVERY HR: 95 BPM
BH CV STRESS SPEED STAGE 1: 1.7
BH CV STRESS SPEED STAGE 2: 2.5
BH CV STRESS SPEED STAGE 3: 3.4
BH CV STRESS STAGE 1: 1
BH CV STRESS STAGE 2: 2
BH CV STRESS STAGE 3: 3
BH CV XLRA - RV BASE: 3.2 CM
BH CV XLRA - RV LENGTH: 7 CM
BH CV XLRA - RV MID: 2.6 CM
BH CV XLRA - TDI S': 10.2 CM/SEC
LEFT ATRIUM VOLUME INDEX: 27.2 ML/M2
MAXIMAL PREDICTED HEART RATE: 156 BPM
MAXIMAL PREDICTED HEART RATE: 156 BPM
PERCENT MAX PREDICTED HR: 107.05 %
SINUS: 3 CM
STJ: 2.7 CM
STRESS BASELINE BP: NORMAL MMHG
STRESS BASELINE HR: 83 BPM
STRESS PERCENT HR: 126 %
STRESS POST ESTIMATED WORKLOAD: 10 METS
STRESS POST EXERCISE DUR MIN: 9 MIN
STRESS POST EXERCISE DUR SEC: 0 SEC
STRESS POST PEAK BP: NORMAL MMHG
STRESS POST PEAK HR: 167 BPM
STRESS TARGET HR: 133 BPM
STRESS TARGET HR: 133 BPM

## 2022-09-26 PROCEDURE — 93016 CV STRESS TEST SUPVJ ONLY: CPT | Performed by: INTERNAL MEDICINE

## 2022-09-26 PROCEDURE — 93306 TTE W/DOPPLER COMPLETE: CPT

## 2022-09-26 PROCEDURE — 93018 CV STRESS TEST I&R ONLY: CPT | Performed by: INTERNAL MEDICINE

## 2022-09-26 PROCEDURE — 93306 TTE W/DOPPLER COMPLETE: CPT | Performed by: INTERNAL MEDICINE

## 2022-09-26 PROCEDURE — 93017 CV STRESS TEST TRACING ONLY: CPT

## 2022-11-01 ENCOUNTER — TELEPHONE (OUTPATIENT)
Dept: SLEEP MEDICINE | Facility: HOSPITAL | Age: 62
End: 2022-11-01

## 2022-11-01 ENCOUNTER — OFFICE VISIT (OUTPATIENT)
Dept: INTERNAL MEDICINE | Facility: CLINIC | Age: 62
End: 2022-11-01

## 2022-11-01 VITALS
WEIGHT: 139.8 LBS | TEMPERATURE: 98 F | HEART RATE: 81 BPM | DIASTOLIC BLOOD PRESSURE: 86 MMHG | BODY MASS INDEX: 24.77 KG/M2 | HEIGHT: 63 IN | OXYGEN SATURATION: 100 % | SYSTOLIC BLOOD PRESSURE: 137 MMHG

## 2022-11-01 DIAGNOSIS — J40 BRONCHITIS: Primary | ICD-10-CM

## 2022-11-01 PROCEDURE — 99213 OFFICE O/P EST LOW 20 MIN: CPT | Performed by: NURSE PRACTITIONER

## 2022-11-01 RX ORDER — DOXYCYCLINE HYCLATE 100 MG/1
100 CAPSULE ORAL 2 TIMES DAILY
Qty: 14 CAPSULE | Refills: 0 | Status: SHIPPED | OUTPATIENT
Start: 2022-11-01 | End: 2022-11-08

## 2022-11-01 RX ORDER — METHYLPREDNISOLONE 4 MG/1
TABLET ORAL
Qty: 21 EACH | Refills: 0 | Status: SHIPPED | OUTPATIENT
Start: 2022-11-01 | End: 2022-12-19

## 2022-11-01 NOTE — PROGRESS NOTES
"Chief Complaint  Cough, Headache, Fever, Nasal Congestion, and Fatigue    Subjective        Jasmin Ureña presents to [facility]  History of Present Illness  This is a 61 y/o female presenting to office with complaints of headache, cough, fever, nasal congestion, and fatigue. Patient reports symptoms started 9 days ago. Patient denies any current fever. Patient reports experiencing cough with production-- yellow sputum. Patient reports taking tylenol PRN for her symptoms. Patient reports taking home covid 19 test which was negative. Patient reports she did have some body aches.     Objective   Vital Signs:  /86 (BP Location: Left arm, Patient Position: Sitting, Cuff Size: Adult)   Pulse 81   Temp 98 °F (36.7 °C) (Infrared)   Ht 160 cm (63\")   Wt 63.4 kg (139 lb 12.8 oz)   SpO2 100%   BMI 24.76 kg/m²   Estimated body mass index is 24.76 kg/m² as calculated from the following:    Height as of this encounter: 160 cm (63\").    Weight as of this encounter: 63.4 kg (139 lb 12.8 oz).    BMI is within normal parameters. No other follow-up for BMI required.      Physical Exam  Constitutional:       Appearance: Normal appearance.   HENT:      Head: Normocephalic and atraumatic.      Right Ear: Ear canal and external ear normal. A middle ear effusion is present.      Left Ear: Ear canal and external ear normal. A middle ear effusion is present.      Nose: Nose normal.      Mouth/Throat:      Mouth: Mucous membranes are moist.      Pharynx: Oropharynx is clear.   Eyes:      Conjunctiva/sclera: Conjunctivae normal.      Pupils: Pupils are equal, round, and reactive to light.   Cardiovascular:      Rate and Rhythm: Normal rate and regular rhythm.      Pulses: Normal pulses.      Heart sounds: Normal heart sounds. No murmur heard.    No friction rub. No gallop.   Pulmonary:      Effort: Pulmonary effort is normal. No respiratory distress.      Breath sounds: Normal breath sounds. No " stridor. No wheezing, rhonchi or rales.   Musculoskeletal:      Cervical back: Normal range of motion and neck supple.   Skin:     General: Skin is warm and dry.   Neurological:      General: No focal deficit present.      Mental Status: She is alert and oriented to person, place, and time. Mental status is at baseline.   Psychiatric:         Mood and Affect: Mood normal.         Thought Content: Thought content normal.        Result Review :  The following data was reviewed by: SAADIA Chirinos on 11/01/2022:  Common labs    Common Labs 7/8/22 7/8/22 7/11/22    1037 1037    Glucose  113 (A)    BUN  8    Creatinine  0.64    Sodium  133 (A)    Potassium  3.8    Chloride  97 (A)    Calcium  9.2    Albumin  4.40    Total Bilirubin  0.4    Alkaline Phosphatase  67    AST (SGOT)  16    ALT (SGPT)  12    WBC 9.82     Hemoglobin 13.1     Hematocrit 38.8     Platelets 196     Total Cholesterol   203 (A)   Triglycerides   60   HDL Cholesterol   52   LDL Cholesterol    140 (A)   (A) Abnormal value                      Assessment and Plan   Diagnoses and all orders for this visit:    1. Bronchitis (Primary)  Assessment & Plan:  Medrol pack as ordered.   Doxycycline 100mg BID x 7 days.   Mucinex 1200mg BID PRN.   Continue with hydration.   Advised if she experiences any worsening SOA or CP to go to ER.     Orders:  -     doxycycline (VIBRAMYCIN) 100 MG capsule; Take 1 capsule by mouth 2 (Two) Times a Day for 7 days.  Dispense: 14 capsule; Refill: 0  -     methylPREDNISolone (MEDROL) 4 MG dose pack; Take as directed on package instructions.  Dispense: 21 each; Refill: 0           Follow Up   Return if symptoms worsen or fail to improve.  Patient was given instructions and counseling regarding her condition or for health maintenance advice. Please see specific information pulled into the AVS if appropriate.

## 2022-11-01 NOTE — ASSESSMENT & PLAN NOTE
Medrol pack as ordered.   Doxycycline 100mg BID x 7 days.   Mucinex 1200mg BID PRN.   Continue with hydration.   Advised if she experiences any worsening SOA or CP to go to ER.

## 2022-11-01 NOTE — TELEPHONE ENCOUNTER
Left message for PT to Call Back for results   Impression:   Sleep disordered breathing, G47.30, without evidence of obstructive sleep apnea or sleep-related hypoxia.     Plan:   Good sleep hygiene measures should be explained to the patient and followed.       As stated above, no obstructive sleep apnea and no sleep-related hypoxia identified on this home sleep study.  There are no findings on this home sleep study to suggest an etiology of the patient's morning headaches.

## 2022-11-17 DIAGNOSIS — R00.2 PALPITATIONS: ICD-10-CM

## 2022-11-17 RX ORDER — METOPROLOL SUCCINATE 25 MG/1
12.5 TABLET, EXTENDED RELEASE ORAL DAILY
Qty: 45 TABLET | Refills: 0 | Status: SHIPPED | OUTPATIENT
Start: 2022-11-17 | End: 2023-03-15 | Stop reason: SDUPTHER

## 2022-12-19 ENCOUNTER — OFFICE VISIT (OUTPATIENT)
Dept: INTERNAL MEDICINE | Facility: CLINIC | Age: 62
End: 2022-12-19

## 2022-12-19 VITALS
OXYGEN SATURATION: 100 % | HEART RATE: 87 BPM | BODY MASS INDEX: 24.8 KG/M2 | WEIGHT: 140 LBS | SYSTOLIC BLOOD PRESSURE: 138 MMHG | DIASTOLIC BLOOD PRESSURE: 80 MMHG | HEIGHT: 63 IN | TEMPERATURE: 98.6 F

## 2022-12-19 DIAGNOSIS — R00.2 PALPITATIONS: ICD-10-CM

## 2022-12-19 DIAGNOSIS — E78.2 MIXED HYPERLIPIDEMIA: ICD-10-CM

## 2022-12-19 DIAGNOSIS — Z00.00 ANNUAL PHYSICAL EXAM: Primary | ICD-10-CM

## 2022-12-19 DIAGNOSIS — R73.01 IMPAIRED FASTING GLUCOSE: ICD-10-CM

## 2022-12-19 DIAGNOSIS — G43.009 MIGRAINE WITHOUT AURA AND WITHOUT STATUS MIGRAINOSUS, NOT INTRACTABLE: Chronic | ICD-10-CM

## 2022-12-19 PROBLEM — J40 BRONCHITIS: Status: RESOLVED | Noted: 2022-11-01 | Resolved: 2022-12-19

## 2022-12-19 PROBLEM — G47.30 SLEEP-DISORDERED BREATHING: Status: RESOLVED | Noted: 2022-08-02 | Resolved: 2022-12-19

## 2022-12-19 LAB
ALBUMIN SERPL-MCNC: 4.9 G/DL (ref 3.5–5.2)
ALBUMIN/GLOB SERPL: 2.2 G/DL
ALP SERPL-CCNC: 84 U/L (ref 39–117)
ALT SERPL-CCNC: 10 U/L (ref 1–33)
AST SERPL-CCNC: 19 U/L (ref 1–32)
BASOPHILS # BLD AUTO: 0.05 10*3/MM3 (ref 0–0.2)
BASOPHILS NFR BLD AUTO: 0.5 % (ref 0–1.5)
BILIRUB SERPL-MCNC: 0.4 MG/DL (ref 0–1.2)
BUN SERPL-MCNC: 8 MG/DL (ref 8–23)
BUN/CREAT SERPL: 11.6 (ref 7–25)
CALCIUM SERPL-MCNC: 9.9 MG/DL (ref 8.6–10.5)
CHLORIDE SERPL-SCNC: 97 MMOL/L (ref 98–107)
CHOLEST SERPL-MCNC: 184 MG/DL (ref 0–200)
CO2 SERPL-SCNC: 28.4 MMOL/L (ref 22–29)
CREAT SERPL-MCNC: 0.69 MG/DL (ref 0.57–1)
EGFRCR SERPLBLD CKD-EPI 2021: 97.1 ML/MIN/1.73
EOSINOPHIL # BLD AUTO: 0.04 10*3/MM3 (ref 0–0.4)
EOSINOPHIL NFR BLD AUTO: 0.4 % (ref 0.3–6.2)
ERYTHROCYTE [DISTWIDTH] IN BLOOD BY AUTOMATED COUNT: 12.6 % (ref 12.3–15.4)
GLOBULIN SER CALC-MCNC: 2.2 GM/DL
GLUCOSE SERPL-MCNC: 89 MG/DL (ref 65–99)
HBA1C MFR BLD: 5.4 % (ref 4.8–5.6)
HCT VFR BLD AUTO: 39.4 % (ref 34–46.6)
HDLC SERPL-MCNC: 57 MG/DL (ref 40–60)
HGB BLD-MCNC: 13 G/DL (ref 12–15.9)
IMM GRANULOCYTES # BLD AUTO: 0.03 10*3/MM3 (ref 0–0.05)
IMM GRANULOCYTES NFR BLD AUTO: 0.3 % (ref 0–0.5)
LDLC SERPL CALC-MCNC: 118 MG/DL (ref 0–100)
LYMPHOCYTES # BLD AUTO: 1.34 10*3/MM3 (ref 0.7–3.1)
LYMPHOCYTES NFR BLD AUTO: 14 % (ref 19.6–45.3)
MCH RBC QN AUTO: 28.9 PG (ref 26.6–33)
MCHC RBC AUTO-ENTMCNC: 33 G/DL (ref 31.5–35.7)
MCV RBC AUTO: 87.6 FL (ref 79–97)
MONOCYTES # BLD AUTO: 0.53 10*3/MM3 (ref 0.1–0.9)
MONOCYTES NFR BLD AUTO: 5.5 % (ref 5–12)
NEUTROPHILS # BLD AUTO: 7.57 10*3/MM3 (ref 1.7–7)
NEUTROPHILS NFR BLD AUTO: 79.3 % (ref 42.7–76)
NRBC BLD AUTO-RTO: 0 /100 WBC (ref 0–0.2)
PLATELET # BLD AUTO: 243 10*3/MM3 (ref 140–450)
POTASSIUM SERPL-SCNC: 4.8 MMOL/L (ref 3.5–5.2)
PROT SERPL-MCNC: 7.1 G/DL (ref 6–8.5)
RBC # BLD AUTO: 4.5 10*6/MM3 (ref 3.77–5.28)
SODIUM SERPL-SCNC: 135 MMOL/L (ref 136–145)
TRIGL SERPL-MCNC: 45 MG/DL (ref 0–150)
TSH SERPL DL<=0.005 MIU/L-ACNC: 0.98 UIU/ML (ref 0.27–4.2)
VLDLC SERPL CALC-MCNC: 9 MG/DL (ref 5–40)
WBC # BLD AUTO: 9.56 10*3/MM3 (ref 3.4–10.8)

## 2022-12-19 PROCEDURE — 99396 PREV VISIT EST AGE 40-64: CPT | Performed by: NURSE PRACTITIONER

## 2022-12-19 NOTE — ASSESSMENT & PLAN NOTE
Recommended 150-300 minutes weekly exercise.   Continue with healthy heart low glycemic diet choices.   Labs ordered.   Mammogram UTD  Due for pap smear-- f/u with gynecology.   Anticipatory guidance given regarding health prevention/wellness, diet/exercise, tobacco/alcohol/drug education, exercise and wellbeing, covid 19 guidance, and sexual health/STD education.

## 2022-12-19 NOTE — PROGRESS NOTES
"Chief Complaint  Follow-up (New pt) and immune system    Subjective        Jasmin Ureña presents to [facility]  History of Present Illness  This is a 63 y/o female presenting to office for establishment of care and annual exam. Patient is currently  and currently retired.     Patient reports current exercise. Following healthy diet.     Patient follows with gynecology-- Dr. Huitron for gynecology; due for pap smear. Patient is UTD on mammogram-- completed in July 2022. Patient reports hx of POP; patient reports sometimes she is able to actually feel this; reports intermittent incontinence. Patient recommended to f/u with gynecology.     Patient has been following with cardiology due to hx heart palpitations; continues on metoprolol. Had full work up-- work up overall unremarkable. Patient has f/u scheduled with Dr. Bonner.     Hx migraines-- using excedrin PRN.     Objective   Vital Signs:  /80 (BP Location: Left arm, Patient Position: Sitting, Cuff Size: Adult)   Pulse 87   Temp 98.6 °F (37 °C) (Infrared)   Ht 160 cm (63\")   Wt 63.5 kg (140 lb)   SpO2 100%   BMI 24.80 kg/m²   Estimated body mass index is 24.8 kg/m² as calculated from the following:    Height as of this encounter: 160 cm (63\").    Weight as of this encounter: 63.5 kg (140 lb).    BMI is within normal parameters. No other follow-up for BMI required.      Physical Exam  Constitutional:       General: She is awake.      Appearance: Normal appearance.   HENT:      Head: Normocephalic.      Right Ear: Hearing and tympanic membrane normal.      Left Ear: Hearing and tympanic membrane normal.      Nose: Nose normal.      Mouth/Throat:      Lips: Pink.      Mouth: Mucous membranes are moist.      Pharynx: Oropharynx is clear.   Eyes:      Extraocular Movements: Extraocular movements intact.      Pupils: Pupils are equal, round, and reactive to light.   Cardiovascular:      Rate and Rhythm: Normal rate " and regular rhythm.      Pulses: Normal pulses.      Heart sounds: Normal heart sounds.   Pulmonary:      Effort: Pulmonary effort is normal.      Breath sounds: Normal breath sounds.   Abdominal:      General: Abdomen is flat. Bowel sounds are normal.      Palpations: Abdomen is soft.   Musculoskeletal:         General: Normal range of motion.      Cervical back: Normal range of motion and neck supple.   Skin:     General: Skin is warm and dry.      Capillary Refill: Capillary refill takes less than 2 seconds.   Neurological:      General: No focal deficit present.      Mental Status: She is alert and oriented to person, place, and time. Mental status is at baseline.      Motor: Motor function is intact.      Coordination: Coordination is intact.      Gait: Gait is intact.      Deep Tendon Reflexes: Reflexes are normal and symmetric.   Psychiatric:         Attention and Perception: Attention normal.         Mood and Affect: Mood normal.         Speech: Speech normal.         Behavior: Behavior normal. Behavior is cooperative.         Thought Content: Thought content normal.         Cognition and Memory: Cognition normal.         Judgment: Judgment normal.        Result Review :  The following data was reviewed by: SAADIA Chirinos on 12/19/2022:  Common labs    Common Labs 7/8/22 7/8/22 7/11/22    1037 1037    Glucose  113 (A)    BUN  8    Creatinine  0.64    Sodium  133 (A)    Potassium  3.8    Chloride  97 (A)    Calcium  9.2    Albumin  4.40    Total Bilirubin  0.4    Alkaline Phosphatase  67    AST (SGOT)  16    ALT (SGPT)  12    WBC 9.82     Hemoglobin 13.1     Hematocrit 38.8     Platelets 196     Total Cholesterol   203 (A)   Triglycerides   60   HDL Cholesterol   52   LDL Cholesterol    140 (A)   (A) Abnormal value                      Assessment and Plan   Diagnoses and all orders for this visit:    1. Annual physical exam (Primary)  Assessment & Plan:  Recommended 150-300 minutes weekly exercise.    Continue with healthy heart low glycemic diet choices.   Labs ordered.   Mammogram UTD  Due for pap smear-- f/u with gynecology.   Anticipatory guidance given regarding health prevention/wellness, diet/exercise, tobacco/alcohol/drug education, exercise and wellbeing, covid 19 guidance, and sexual health/STD education.       Orders:  -     TSH Rfx On Abnormal To Free T4    2. Impaired fasting glucose  -     Hemoglobin A1c    3. Migraine without aura and without status migrainosus, not intractable  Assessment & Plan:  Uses excedrin migraine PRN.         4. Mixed hyperlipidemia  Assessment & Plan:  Lipid abnormalities are unchanged.  Nutritional counseling was provided.  Lipids will be reassessed in 1 year.    Orders:  -     Lipid panel    5. Palpitations  Assessment & Plan:  Continues on metoprolol.   Following with cardiology.   Advised to avoid caffeine.     Orders:  -     CBC & Differential  -     Comprehensive metabolic panel           Follow Up   Return in about 1 year (around 12/19/2023) for Annual physical.  Patient was given instructions and counseling regarding her condition or for health maintenance advice. Please see specific information pulled into the AVS if appropriate.

## 2022-12-20 NOTE — PROGRESS NOTES
Please let patient know--  CBC stable  CMP stable  A1C at goal  TSH within normal limits  LDL improved-- now at 118; recommend low sat fat/low refined carb diet and exercise.     F/u as scheduled.

## 2022-12-23 ENCOUNTER — PATIENT ROUNDING (BHMG ONLY) (OUTPATIENT)
Dept: INTERNAL MEDICINE | Facility: CLINIC | Age: 62
End: 2022-12-23

## 2023-01-12 ENCOUNTER — OFFICE VISIT (OUTPATIENT)
Dept: CARDIOLOGY | Facility: CLINIC | Age: 63
End: 2023-01-12
Payer: OTHER GOVERNMENT

## 2023-01-12 VITALS
WEIGHT: 142 LBS | HEART RATE: 77 BPM | OXYGEN SATURATION: 98 % | BODY MASS INDEX: 25.16 KG/M2 | HEIGHT: 63 IN | SYSTOLIC BLOOD PRESSURE: 124 MMHG | DIASTOLIC BLOOD PRESSURE: 78 MMHG

## 2023-01-12 DIAGNOSIS — R00.2 PALPITATIONS: Primary | ICD-10-CM

## 2023-01-12 PROCEDURE — 99214 OFFICE O/P EST MOD 30 MIN: CPT | Performed by: INTERNAL MEDICINE

## 2023-01-12 NOTE — PROGRESS NOTES
"      CARDIOLOGY    Lobo Bonner MD    ENCOUNTER DATE:  01/12/2023    Jasmin Ureña / 62 y.o. / female        CHIEF COMPLAINT / REASON FOR OFFICE VISIT     Palpitations (09/07/2022 Follow up)      HISTORY OF PRESENT ILLNESS       HPI  Jasmin Ureña is a 62 y.o. female who presents today for reevaluation.  Overall she is actually doing well.  She is back to exercising.  She says she is walking 4 miles at a time 3 times a week with no issues.  She still occasionally has some palpitations.  She denies any types of chest discomfort.  She says the palpitations are currently tolerable.  Her blood pressure was elevated but is also doing significantly better.      The following portions of the patient's history were reviewed and updated as appropriate: allergies, current medications, past family history, past medical history, past social history, past surgical history and problem list.      VITAL SIGNS     Visit Vitals  /78 (BP Location: Left arm)   Pulse 77   Ht 160 cm (63\")   Wt 64.4 kg (142 lb)   SpO2 98%   BMI 25.15 kg/m²         Wt Readings from Last 3 Encounters:   01/12/23 64.4 kg (142 lb)   12/19/22 63.5 kg (140 lb)   11/01/22 63.4 kg (139 lb 12.8 oz)     Body mass index is 25.15 kg/m².      REVIEW OF SYSTEMS   ROS        PHYSICAL EXAMINATION     Vitals reviewed.   Constitutional:       Appearance: Healthy appearance.   Pulmonary:      Effort: Pulmonary effort is normal.   Cardiovascular:      Normal rate. Regular rhythm. Normal S1. Normal S2.      Murmurs: There is no murmur.      No gallop. No click. No rub.   Pulses:     Intact distal pulses.   Edema:     Peripheral edema absent.   Neurological:      Mental Status: Alert.           REVIEWED DATA     Procedures    Cardiac Procedures:  1.     Lipid Panel    Lipid Panel 7/11/22 12/19/22   Total Cholesterol 203 (A) 184   Triglycerides 60 45   HDL Cholesterol 52 57   VLDL Cholesterol 11 9   LDL Cholesterol  140 (A) 118 (A)   (A) Abnormal value     "   Comments are available for some flowsheets but are not being displayed.               ASSESSMENT & PLAN      Diagnosis Plan   1. Palpitations              SUMMARY/DISCUSSION  1. Palpitations patient's symptoms are much better her blood pressure is also doing very well.  She did ask about coming off the Toprol.  This is very reasonable since her palpitations are tolerable.  I did tell her to follow her blood pressure as well as her symptoms.  If she does well we will keep her off metoprolol and follow her back in about 3 to 4 months for reassessment.  Again I told her that the goal when we treat palpitations are to make them tolerable if they are tolerable off her beta-blocker and her blood pressure stays fine I would just see how she does clinically.        MEDICATIONS         Discharge Medications          Accurate as of January 12, 2023 10:15 AM. If you have any questions, ask your nurse or doctor.            Continue These Medications      Instructions Start Date   aspirin-acetaminophen-caffeine 250-250-65 MG per tablet  Commonly known as: EXCEDRIN MIGRAINE   1 tablet, Oral, Every 6 Hours PRN      metoprolol succinate XL 25 MG 24 hr tablet  Commonly known as: TOPROL-XL   12.5 mg, Oral, Daily                 **Dragon Disclaimer:   Much of this encounter note is an electronic transcription/translation of spoken language to printed text. The electronic translation of spoken language may permit erroneous, or at times, nonsensical words or phrases to be inadvertently transcribed. Although I have reviewed the note for such errors, some may still exist.

## 2023-03-15 DIAGNOSIS — R00.2 PALPITATIONS: ICD-10-CM

## 2023-03-15 RX ORDER — METOPROLOL SUCCINATE 25 MG/1
12.5 TABLET, EXTENDED RELEASE ORAL DAILY
Qty: 45 TABLET | Refills: 0 | Status: SHIPPED | OUTPATIENT
Start: 2023-03-15

## 2023-03-15 NOTE — TELEPHONE ENCOUNTER
Caller: Jasmin Ureña    Relationship: Self    Best call back number: [phone]    Requested Prescriptions:   Requested Prescriptions     Pending Prescriptions Disp Refills   • metoprolol succinate XL (TOPROL-XL) 25 MG 24 hr tablet 45 tablet 0     Sig: Take 0.5 tablets by mouth Daily.        Pharmacy where request should be sent: [facility] #[id number] - [place], [place] - [address] AT [facility] [phone] [facility] [phone]      Additional details provided by patient:     Does the patient have less than a 3 day supply:  [] Yes  [x] No    Would you like a call back once the refill request has been completed: [] Yes [x] No    If the office needs to give you a call back, can they leave a voicemail: [] Yes [x] No    Yeison Monahan Rep   03/15/23 10:38 EDT

## 2023-03-15 NOTE — TELEPHONE ENCOUNTER
Rx Refill Note  Requested Prescriptions     Pending Prescriptions Disp Refills   • metoprolol succinate XL (TOPROL-XL) 25 MG 24 hr tablet 45 tablet 0     Sig: Take 0.5 tablets by mouth Daily.      Last office visit with prescribing clinician: 12/19/2022   Last telemedicine visit with prescribing clinician: Visit date not found   Next office visit with prescribing clinician: 12/21/2023                         Would you like a call back once the refill request has been completed: [] Yes [] No    If the office needs to give you a call back, can they leave a voicemail: [] Yes [] No    Asha Hernandez  03/15/23, 14:34 EDT

## 2023-04-27 ENCOUNTER — OFFICE VISIT (OUTPATIENT)
Dept: CARDIOLOGY | Facility: CLINIC | Age: 63
End: 2023-04-27
Payer: MEDICARE

## 2023-04-27 VITALS
BODY MASS INDEX: 24.98 KG/M2 | DIASTOLIC BLOOD PRESSURE: 70 MMHG | HEART RATE: 68 BPM | WEIGHT: 141 LBS | HEIGHT: 63 IN | SYSTOLIC BLOOD PRESSURE: 110 MMHG

## 2023-04-27 DIAGNOSIS — R00.2 PALPITATIONS: Primary | ICD-10-CM

## 2023-04-27 PROCEDURE — 99213 OFFICE O/P EST LOW 20 MIN: CPT | Performed by: INTERNAL MEDICINE

## 2023-04-27 PROCEDURE — 93000 ELECTROCARDIOGRAM COMPLETE: CPT | Performed by: INTERNAL MEDICINE

## 2023-04-27 NOTE — PROGRESS NOTES
"      CARDIOLOGY    Lobo Bonner MD    ENCOUNTER DATE:  04/27/2023    Jasmin Ureña / 63 y.o. / female        CHIEF COMPLAINT / REASON FOR OFFICE VISIT     Palpitations  Chest discomfort    HISTORY OF PRESENT ILLNESS       HPI  Jasmin Ureña is a 63 y.o. female who presents today for reevaluation.  Overall she is actually doing relatively well.  She did try to stop her beta-blocker but said she noticed her blood pressure went up.  She also said that the beta-blocker seems to help with the palpitations makes him a lot last.  She does best if she takes it at nighttime she said she noticed that her blood pressure is higher in the morning.  She did have a sleep study and it reportedly was negative although sometimes she questions this.  Overall she looks good today and I think she is doing well.      The following portions of the patient's history were reviewed and updated as appropriate: allergies, current medications, past family history, past medical history, past social history, past surgical history and problem list.      VITAL SIGNS     Visit Vitals  /70   Pulse 68   Ht 160 cm (63\")   Wt 64 kg (141 lb)   BMI 24.98 kg/m²         Wt Readings from Last 3 Encounters:   04/27/23 64 kg (141 lb)   01/12/23 64.4 kg (142 lb)   12/19/22 63.5 kg (140 lb)     Body mass index is 24.98 kg/m².      REVIEW OF SYSTEMS   ROS        PHYSICAL EXAMINATION     Vitals reviewed.   Constitutional:       Appearance: Healthy appearance.   Pulmonary:      Effort: Pulmonary effort is normal.   Cardiovascular:      Normal rate. Regular rhythm. Normal S1. Normal S2.      Murmurs: There is no murmur.      No gallop. No click. No rub.   Pulses:     Intact distal pulses.   Edema:     Peripheral edema absent.   Neurological:      Mental Status: Alert and oriented to person, place and time.           REVIEWED DATA       ECG 12 Lead    Date/Time: 4/27/2023 9:10 AM  Performed by: Lobo Bonner MD  Authorized by: Ha, " Lobo VYAS MD   Comparison: compared with previous ECG from 9/7/2022  Similar to previous ECG  Rhythm: sinus rhythm  Other findings: poor R wave progression    Clinical impression: non-specific ECG            Cardiac Procedures:  1.     Lipid Panel        7/11/2022    09:40 12/19/2022    09:52   Lipid Panel   Total Cholesterol 203   184     Triglycerides 60   45     HDL Cholesterol 52   57     VLDL Cholesterol 11   9     LDL Cholesterol  140   118           ASSESSMENT & PLAN      Diagnosis Plan   1. Palpitations              SUMMARY/DISCUSSION  1. Palpitations.  Patient does better on the metoprolol so she just remains on a small dose of 12.5.  Overall these are tolerated she is doing well.  2. Blood pressure drifted up a little bit she said that the beta-blocker helped also with this.  3. Patient told to follow-up in 1 year sooner if issues.        MEDICATIONS         Discharge Medications          Accurate as of April 27, 2023  9:11 AM. If you have any questions, ask your nurse or doctor.            Continue These Medications      Instructions Start Date   aspirin-acetaminophen-caffeine 250-250-65 MG per tablet  Commonly known as: EXCEDRIN MIGRAINE   1 tablet, Oral, Every 6 Hours PRN      metoprolol succinate XL 25 MG 24 hr tablet  Commonly known as: TOPROL-XL   12.5 mg, Oral, Daily                 **Dragon Disclaimer:   Much of this encounter note is an electronic transcription/translation of spoken language to printed text. The electronic translation of spoken language may permit erroneous, or at times, nonsensical words or phrases to be inadvertently transcribed. Although I have reviewed the note for such errors, some may still exist.

## 2023-06-06 ENCOUNTER — PATIENT MESSAGE (OUTPATIENT)
Dept: INTERNAL MEDICINE | Facility: CLINIC | Age: 63
End: 2023-06-06
Payer: MEDICARE

## 2023-06-06 DIAGNOSIS — M25.562 PAIN AND SWELLING OF LEFT KNEE: Primary | ICD-10-CM

## 2023-06-06 DIAGNOSIS — M25.462 PAIN AND SWELLING OF LEFT KNEE: Primary | ICD-10-CM

## 2023-06-06 NOTE — TELEPHONE ENCOUNTER
From: Jasmin Ureña  To: Genoveva Loredo  Sent: 6/6/2023 10:43 AM EDT  Subject: Referral for orthopedic consult    Camron Tomas,  I have been having knee swelling with no Pain. Also, I havent had an injury. It probably began about 10 months ago with a little tightness and gradually worsened. in the last two weeks it has increased significantly.  If Possible could you try Dr. Trace Lomeli.  thanks and have a great day!    Jasmin Ureña

## 2023-06-17 DIAGNOSIS — R00.2 PALPITATIONS: ICD-10-CM

## 2023-06-19 RX ORDER — METOPROLOL SUCCINATE 25 MG/1
TABLET, EXTENDED RELEASE ORAL
Qty: 45 TABLET | Refills: 0 | Status: SHIPPED | OUTPATIENT
Start: 2023-06-19

## 2023-08-09 ENCOUNTER — HOSPITAL ENCOUNTER (EMERGENCY)
Facility: HOSPITAL | Age: 63
Discharge: HOME | End: 2023-08-09
Attending: EMERGENCY MEDICINE
Payer: MEDICARE

## 2023-08-09 ENCOUNTER — APPOINTMENT (OUTPATIENT)
Dept: CT IMAGING | Facility: HOSPITAL | Age: 63
End: 2023-08-09
Payer: MEDICARE

## 2023-08-09 VITALS
TEMPERATURE: 97.8 F | HEART RATE: 81 BPM | BODY MASS INDEX: 25.69 KG/M2 | SYSTOLIC BLOOD PRESSURE: 176 MMHG | RESPIRATION RATE: 16 BRPM | HEIGHT: 63 IN | OXYGEN SATURATION: 100 % | DIASTOLIC BLOOD PRESSURE: 91 MMHG | WEIGHT: 145 LBS

## 2023-08-09 DIAGNOSIS — S22.32XA CLOSED FRACTURE OF ONE RIB OF LEFT SIDE, INITIAL ENCOUNTER: Primary | ICD-10-CM

## 2023-08-09 DIAGNOSIS — S22.009A CLOSED FRACTURE OF TRANSVERSE PROCESS OF THORACIC VERTEBRA, INITIAL ENCOUNTER: ICD-10-CM

## 2023-08-09 DIAGNOSIS — S32.009A CLOSED FRACTURE OF TRANSVERSE PROCESS OF LUMBAR VERTEBRA, INITIAL ENCOUNTER: ICD-10-CM

## 2023-08-09 DIAGNOSIS — R91.1 PULMONARY NODULE: ICD-10-CM

## 2023-08-09 PROCEDURE — 71250 CT THORAX DX C-: CPT

## 2023-08-09 PROCEDURE — 99284 EMERGENCY DEPT VISIT MOD MDM: CPT

## 2023-08-09 RX ORDER — DOCUSATE SODIUM 100 MG/1
100 CAPSULE, LIQUID FILLED ORAL 2 TIMES DAILY PRN
Qty: 30 CAPSULE | Refills: 0 | Status: SHIPPED | OUTPATIENT
Start: 2023-08-09

## 2023-08-09 RX ORDER — OXYCODONE HYDROCHLORIDE AND ACETAMINOPHEN 5; 325 MG/1; MG/1
TABLET ORAL
Qty: 18 TABLET | Refills: 0 | Status: SHIPPED | OUTPATIENT
Start: 2023-08-09

## 2023-08-09 RX ORDER — MELOXICAM 15 MG/1
15 TABLET ORAL DAILY
Qty: 10 TABLET | Refills: 0 | Status: SHIPPED | OUTPATIENT
Start: 2023-08-09 | End: 2023-08-19
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
## 2023-08-09 NOTE — DISCHARGE INSTRUCTIONS
Today on the CAT scan we did find a left posterior rib fracture.  If you suddenly were to become short of breath we do need to repeat a chest x-ray to look at your lungs.    You do also have a transverse process fracture on the left of T12 and L1 vertebrae.  These are stable fractures but do cause discomfort.  They will heal with pain control    Lastly you do have some pulmonary nodules as noted on your CAT scan.  It does sound like these have been appropriately evaluated but I did want to make you aware.  Please make your primary care physician aware of this just to make sure they are all identical as before on previous CAT scans.    I did send in appropriate medications.  Take as directed    Please read all of the instructions in this handout.  If you receive prescriptions please fill them and take them as directed.  Please call your primary care physician for follow-up appointment in the next 5 to 7 days.  If you do not have a physician you may call the [facility] referral line at [phone].    You may return to the emergency department at any time for any concerns such as worsening symptoms.  If you received a work or school note it will be printed at the back of this packet.

## 2023-08-22 ENCOUNTER — OFFICE VISIT (OUTPATIENT)
Dept: INTERNAL MEDICINE | Facility: CLINIC | Age: 63
End: 2023-08-22
Payer: MEDICARE

## 2023-08-22 VITALS
OXYGEN SATURATION: 99 % | HEIGHT: 63 IN | DIASTOLIC BLOOD PRESSURE: 90 MMHG | SYSTOLIC BLOOD PRESSURE: 120 MMHG | BODY MASS INDEX: 25.16 KG/M2 | WEIGHT: 142 LBS | HEART RATE: 74 BPM

## 2023-08-22 DIAGNOSIS — R91.1 LUNG NODULE: ICD-10-CM

## 2023-08-22 DIAGNOSIS — Z00.00 ROUTINE HEALTH MAINTENANCE: ICD-10-CM

## 2023-08-22 DIAGNOSIS — Z78.0 POST-MENOPAUSAL: ICD-10-CM

## 2023-08-22 DIAGNOSIS — S22.009D CLOSED FRACTURE OF TRANSVERSE PROCESS OF THORACIC VERTEBRA WITH ROUTINE HEALING, SUBSEQUENT ENCOUNTER: Primary | ICD-10-CM

## 2023-08-22 DIAGNOSIS — S32.009D CLOSED FRACTURE OF TRANSVERSE PROCESS OF LUMBAR VERTEBRA WITH ROUTINE HEALING, SUBSEQUENT ENCOUNTER: ICD-10-CM

## 2023-08-22 PROBLEM — S22.009A CLOSED FRACTURE OF TRANSVERSE PROCESS OF THORACIC VERTEBRA: Status: ACTIVE | Noted: 2023-08-22

## 2023-08-22 PROBLEM — S32.009A CLOSED FRACTURE OF TRANSVERSE PROCESS OF LUMBAR VERTEBRA: Status: ACTIVE | Noted: 2023-08-22

## 2023-08-22 NOTE — ASSESSMENT & PLAN NOTE
Tylenol PRN for pain control  Referral to neurosurgery for further guidance regarding activity restrictions  Advised no lifting more than 10 pounds, no twisting, bending until seen by neurosurgery
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
## 2023-08-23 ENCOUNTER — HOSPITAL ENCOUNTER (OUTPATIENT)
Dept: CT IMAGING | Facility: HOSPITAL | Age: 63
Discharge: HOME | End: 2023-08-23
Admitting: INTERNAL MEDICINE

## 2023-08-23 DIAGNOSIS — Z82.49 FAMILY HISTORY OF CORONARY ARTERY DISEASE: ICD-10-CM

## 2023-08-23 PROCEDURE — 75571 CT HRT W/O DYE W/CA TEST: CPT

## 2023-09-11 DIAGNOSIS — R00.2 PALPITATIONS: ICD-10-CM

## 2023-09-11 RX ORDER — METOPROLOL SUCCINATE 25 MG/1
TABLET, EXTENDED RELEASE ORAL
Qty: 45 TABLET | Refills: 0 | Status: SHIPPED | OUTPATIENT
Start: 2023-09-11

## 2023-09-20 ENCOUNTER — OFFICE VISIT (OUTPATIENT)
Dept: NEUROSURGERY | Facility: CLINIC | Age: 63
End: 2023-09-20
Payer: MEDICARE

## 2023-09-20 VITALS
HEART RATE: 69 BPM | SYSTOLIC BLOOD PRESSURE: 132 MMHG | TEMPERATURE: 97.1 F | HEIGHT: 63 IN | DIASTOLIC BLOOD PRESSURE: 80 MMHG | BODY MASS INDEX: 25.37 KG/M2 | WEIGHT: 143.2 LBS | RESPIRATION RATE: 16 BRPM | OXYGEN SATURATION: 98 %

## 2023-09-20 DIAGNOSIS — S32.009A CLOSED FRACTURE OF TRANSVERSE PROCESS OF LUMBAR VERTEBRA, INITIAL ENCOUNTER: Primary | ICD-10-CM

## 2023-09-20 DIAGNOSIS — S22.009A CLOSED FRACTURE OF TRANSVERSE PROCESS OF THORACIC VERTEBRA, INITIAL ENCOUNTER: ICD-10-CM
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
## 2023-10-23 ENCOUNTER — OFFICE VISIT (OUTPATIENT)
Dept: INTERNAL MEDICINE | Facility: CLINIC | Age: 63
End: 2023-10-23
Payer: MEDICARE

## 2023-10-23 VITALS
SYSTOLIC BLOOD PRESSURE: 130 MMHG | BODY MASS INDEX: 25.52 KG/M2 | OXYGEN SATURATION: 97 % | WEIGHT: 144 LBS | DIASTOLIC BLOOD PRESSURE: 90 MMHG | HEART RATE: 74 BPM | HEIGHT: 63 IN

## 2023-10-23 DIAGNOSIS — H00.014 HORDEOLUM EXTERNUM OF LEFT UPPER EYELID: Primary | ICD-10-CM

## 2023-10-23 PROCEDURE — 99213 OFFICE O/P EST LOW 20 MIN: CPT | Performed by: NURSE PRACTITIONER

## 2023-10-23 PROCEDURE — 1160F RVW MEDS BY RX/DR IN RCRD: CPT | Performed by: NURSE PRACTITIONER

## 2023-10-23 PROCEDURE — 1159F MED LIST DOCD IN RCRD: CPT | Performed by: NURSE PRACTITIONER

## 2023-10-23 RX ORDER — DOXYCYCLINE HYCLATE 100 MG/1
1 CAPSULE ORAL EVERY 12 HOURS SCHEDULED
COMMUNITY
Start: 2023-10-20

## 2023-10-23 RX ORDER — TOBRAMYCIN AND DEXAMETHASONE 3; 1 MG/ML; MG/ML
SUSPENSION/ DROPS OPHTHALMIC
COMMUNITY
Start: 2023-10-20
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
## 2023-10-23 NOTE — ASSESSMENT & PLAN NOTE
Continues on doxycycline as prescribed  Continue on tobramycin/dexamethasone drops as prescribed  Continue with warm compresses  We discussed red flag symptoms including progressive swelling, increased visual changes, fever, etc.

## 2023-12-07 DIAGNOSIS — R00.2 PALPITATIONS: ICD-10-CM

## 2023-12-07 RX ORDER — METOPROLOL SUCCINATE 25 MG/1
TABLET, EXTENDED RELEASE ORAL
Qty: 45 TABLET | Refills: 0 | Status: SHIPPED | OUTPATIENT
Start: 2023-12-07

## 2024-01-22 ENCOUNTER — OFFICE VISIT (OUTPATIENT)
Dept: INTERNAL MEDICINE | Facility: CLINIC | Age: 64
End: 2024-01-22
Payer: MEDICARE

## 2024-01-22 VITALS
SYSTOLIC BLOOD PRESSURE: 130 MMHG | BODY MASS INDEX: 25.16 KG/M2 | HEART RATE: 73 BPM | DIASTOLIC BLOOD PRESSURE: 80 MMHG | OXYGEN SATURATION: 98 % | HEIGHT: 63 IN | WEIGHT: 142 LBS

## 2024-01-22 DIAGNOSIS — Z12.31 SCREENING MAMMOGRAM FOR BREAST CANCER: ICD-10-CM

## 2024-01-22 DIAGNOSIS — Z71.85 VACCINE COUNSELING: ICD-10-CM

## 2024-01-22 DIAGNOSIS — G43.009 MIGRAINE WITHOUT AURA AND WITHOUT STATUS MIGRAINOSUS, NOT INTRACTABLE: Chronic | ICD-10-CM

## 2024-01-22 DIAGNOSIS — E78.2 MIXED HYPERLIPIDEMIA: ICD-10-CM

## 2024-01-22 DIAGNOSIS — R91.1 LUNG NODULE: ICD-10-CM

## 2024-01-22 DIAGNOSIS — R00.2 PALPITATIONS: ICD-10-CM

## 2024-01-22 DIAGNOSIS — Z00.00 MEDICARE ANNUAL WELLNESS VISIT, INITIAL: Primary | ICD-10-CM

## 2024-01-22 DIAGNOSIS — Z13.31 DEPRESSION SCREENING NEGATIVE: ICD-10-CM

## 2024-01-22 DIAGNOSIS — R73.01 IMPAIRED FASTING GLUCOSE: ICD-10-CM

## 2024-01-22 PROBLEM — H00.014 HORDEOLUM EXTERNUM OF LEFT UPPER EYELID: Status: RESOLVED | Noted: 2023-10-23 | Resolved: 2024-01-22

## 2024-01-22 PROCEDURE — 1160F RVW MEDS BY RX/DR IN RCRD: CPT | Performed by: NURSE PRACTITIONER

## 2024-01-22 PROCEDURE — 99214 OFFICE O/P EST MOD 30 MIN: CPT | Performed by: NURSE PRACTITIONER

## 2024-01-22 PROCEDURE — G0438 PPPS, INITIAL VISIT: HCPCS | Performed by: NURSE PRACTITIONER

## 2024-01-22 PROCEDURE — 1159F MED LIST DOCD IN RCRD: CPT | Performed by: NURSE PRACTITIONER

## 2024-01-22 PROCEDURE — G9903 PT SCRN TBCO ID AS NON USER: HCPCS | Performed by: NURSE PRACTITIONER

## 2024-01-22 PROCEDURE — 1170F FXNL STATUS ASSESSED: CPT | Performed by: NURSE PRACTITIONER

## 2024-01-22 RX ORDER — METOPROLOL SUCCINATE 25 MG/1
TABLET, EXTENDED RELEASE ORAL
Qty: 45 TABLET | Refills: 3 | Status: SHIPPED | OUTPATIENT
Start: 2024-01-22

## 2024-01-22 NOTE — ASSESSMENT & PLAN NOTE
Stable on metoprolol  Continue with metoprolol  Denies any red flag symptoms  Following with cardiology

## 2024-01-22 NOTE — PROGRESS NOTES
The ABCs of the Annual Wellness Visit  Initial Medicare Wellness Visit    Subjective     Jasmin Ureña is a 63 y.o. female who presents for an Initial Medicare Wellness Visit.    The following portions of the patient's history were reviewed and   updated as appropriate: allergies, current medications, past family history, past medical history, past social history, past surgical history, and problem list.     Compared to one year ago, the patient feels her physical   health is the same.    Compared to one year ago, the patient feels her mental   health is the same.    Recent Hospitalizations:  She was not admitted to the hospital during the last year.       Current Medical Providers:  Patient Care Team:  Genoveva Loredo APRN as PCP - General (Internal Medicine)  Mayte Ness MD as Consulting Physician (Gynecology)  Lobo Bonner MD as Consulting Physician (Cardiology)  Brian Lomeli MD as Surgeon (Orthopedic Surgery)    Outpatient Medications Prior to Visit   Medication Sig Dispense Refill    aspirin-acetaminophen-caffeine (EXCEDRIN MIGRAINE) 250-250-65 MG per tablet Take 1 tablet by mouth Every 6 (Six) Hours As Needed for Headache.      metoprolol succinate XL (TOPROL-XL) 25 MG 24 hr tablet TAKE A HALF TABLET BY MOUTH EVERY DAY 45 tablet 0    doxycycline (VIBRAMYCIN) 100 MG capsule Take 1 capsule by mouth Every 12 (Twelve) Hours.      tobramycin-dexAMETHasone (TOBRADEX) 0.3-0.1 % ophthalmic suspension INSTILL 1 DROP IN LEFT EYE FOUR TIMES A DAY       No facility-administered medications prior to visit.       No opioid medication identified on active medication list. I have reviewed chart for other potential  high risk medication/s and harmful drug interactions in the elderly.        Aspirin is not on active medication list.  Aspirin use is not indicated based on review of current medical condition/s. Risk of harm outweighs potential benefits.  .    Patient Active Problem List   Diagnosis     "Migraine without aura and without status migrainosus, not intractable    Impaired fasting glucose    Palpitations    Mixed hyperlipidemia    Closed fracture of transverse process of thoracic vertebra    Closed fracture of transverse process of lumbar vertebra    Lung nodule     Advance Care Planning   Advance Care Planning     Advance Directive is not on file.  ACP discussion was held with the patient during this visit. Patient does not have an advance directive, information provided.       Objective    Vitals:    01/22/24 1445   BP: 130/80   BP Location: Left arm   Patient Position: Sitting   Cuff Size: Adult   Pulse: 73   SpO2: 98%   Weight: 64.4 kg (142 lb)   Height: 160 cm (62.99\")     Estimated body mass index is 25.16 kg/m² as calculated from the following:    Height as of this encounter: 160 cm (62.99\").    Weight as of this encounter: 64.4 kg (142 lb).           Does the patient have evidence of cognitive impairment?   No    Lab Results   Component Value Date    CHLPL 253 (H) 01/22/2024    TRIG 53 01/22/2024    HDL 65 (H) 01/22/2024     (H) 01/22/2024    VLDL 8 01/22/2024    HGBA1C 5.30 01/22/2024        HEALTH RISK ASSESSMENT    Smoking Status:  Social History     Tobacco Use   Smoking Status Never    Passive exposure: Never   Smokeless Tobacco Never   Tobacco Comments    caffeine use 3 cups coffee daily     Alcohol Consumption:  Social History     Substance and Sexual Activity   Alcohol Use Yes    Alcohol/week: 1.0 standard drink of alcohol    Types: 1 Drinks containing 0.5 oz of alcohol per week    Comment: or less     Fall Risk Screen:    ANGEL Fall Risk Assessment was completed, and patient is at LOW risk for falls.Assessment completed on:1/22/2024    Depression Screen:       1/29/2024    12:52 PM   PHQ-2/PHQ-9 Depression Screening   Little Interest or Pleasure in Doing Things 0-->not at all   Feeling Down, Depressed or Hopeless 0-->not at all   PHQ-9: Brief Depression Severity Measure Score 0 "       Health Habits and Functional and Cognitive Screenin/22/2024     2:48 PM   Functional & Cognitive Status   Do you have difficulty preparing food and eating? No   Do you have difficulty bathing yourself, getting dressed or grooming yourself? No   Do you have difficulty using the toilet? No   Do you have difficulty moving around from place to place? No   Do you have trouble with steps or getting out of a bed or a chair? No   Current Diet Other   Dental Exam Up to date   Eye Exam Up to date   Exercise (times per week) 1 times per week   Current Exercises Include Walking;Stationary Bicycling/Spin Class   Do you need help using the phone?  No   Are you deaf or do you have serious difficulty hearing?  No   Do you need help to go to places out of walking distance? No   Do you need help shopping? No   Do you need help preparing meals?  No   Do you need help with housework?  No   Do you need help with laundry? No   Do you need help taking your medications? No   Do you need help managing money? No   Do you ever drive or ride in a car without wearing a seat belt? No   Have you felt unusual stress, anger or loneliness in the last month? No   Who do you live with? Spouse   If you need help, do you have trouble finding someone available to you? No   Do you have difficulty concentrating, remembering or making decisions? No       Age-appropriate Screening Schedule:  Refer to the list below for future screening recommendations based on patient's age, sex and/or medical conditions. Orders for these recommended tests are listed in the plan section. The patient has been provided with a written plan.    Health Maintenance   Topic Date Due    DXA SCAN  Never done    COVID-19 Vaccine (1) Never done    TDAP/TD VACCINES (1 - Tdap) Never done    INFLUENZA VACCINE  2024 (Originally 2023)    Pneumococcal Vaccine 65+ (1 of 1 - PCV) 2025 (Originally 3/13/2023)    ZOSTER VACCINE (1 of 2) 2025 (Originally  3/13/2008)    BMI FOLLOWUP  07/06/2024    MAMMOGRAM  07/25/2024    ANNUAL WELLNESS VISIT  01/22/2025    LIPID PANEL  01/22/2025    COLORECTAL CANCER SCREENING  01/01/2026    HEPATITIS C SCREENING  Completed          CMS Preventative Services Quick Reference  Risk Factors Identified During Encounter    Immunizations Discussed/Encouraged: Tdap, Influenza, Prevnar 20 (Pneumococcal 20-valent conjugate), and Shingrix  Dental Screening Recommended  Vision Screening Recommended    The above risks/problems have been discussed with the patient.  Pertinent information has been shared with the patient in the After Visit Summary.  An After Visit Summary and PPPS were made available to the patient.  Diagnoses and all orders for this visit:    1. Medicare annual wellness visit, initial (Primary)  -     CBC & Differential  -     Comprehensive metabolic panel    2. Palpitations  Assessment & Plan:  Stable on metoprolol  Continue with metoprolol  Denies any red flag symptoms  Following with cardiology    Orders:  -     metoprolol succinate XL (TOPROL-XL) 25 MG 24 hr tablet; Take a half tablet by mouth every day.  Dispense: 45 tablet; Refill: 3    3. Impaired fasting glucose  -     Hemoglobin A1c    4. Mixed hyperlipidemia  Assessment & Plan:  Lipid abnormalities are unchanged.  Nutritional counseling was provided.  Lipids will be reassessed in 1 year.    Orders:  -     TSH Rfx On Abnormal To Free T4  -     Lipid panel    5. Screening mammogram for breast cancer  -     Mammo screening digital tomosynthesis bilateral w CAD; Future    6. Migraine without aura and without status migrainosus, not intractable  Assessment & Plan:  Headaches are improving with treatment.  Continue current treatment regimen.  Cont with excedrin PRN      7. Lung nodule  Assessment & Plan:  Has CT scheduled       8. Vaccine counseling [Z71.85]    9. Depression screening negative [Z13.31]      Follow Up:  Next Medicare Wellness visit to be scheduled in 1 year.   "      Additional E&M Note during same encounter follows:  Patient has multiple medical problems which are significant and separately identifiable that require additional work above and beyond the Medicare Wellness Visit.      Chief Complaint  Medicare Wellness-subsequent    Subjective        HPI  Jasmin Ureña presents today for chronic health management and medication management.     Patient reports continuing on metoprolol for hx palpations-- following with cardiology. Reports this has significantly helped with her palpations.     Hx migraines-- using excedrin migraine PRN; reports this            Objective   Vital Signs:  /80 (BP Location: Left arm, Patient Position: Sitting, Cuff Size: Adult)   Pulse 73   Ht 160 cm (62.99\")   Wt 64.4 kg (142 lb)   SpO2 98%   BMI 25.16 kg/m²     Physical Exam  Constitutional:       Appearance: Normal appearance.   HENT:      Head: Normocephalic and atraumatic.      Right Ear: External ear normal.      Left Ear: External ear normal.      Nose: Nose normal.      Mouth/Throat:      Mouth: Mucous membranes are moist.      Pharynx: Oropharynx is clear.   Eyes:      Conjunctiva/sclera: Conjunctivae normal.      Pupils: Pupils are equal, round, and reactive to light.   Cardiovascular:      Rate and Rhythm: Normal rate and regular rhythm.      Pulses: Normal pulses.      Heart sounds: Normal heart sounds. No murmur heard.     No gallop.   Pulmonary:      Effort: Pulmonary effort is normal. No respiratory distress.      Breath sounds: Normal breath sounds. No wheezing or rales.   Musculoskeletal:      Cervical back: Normal range of motion and neck supple.   Skin:     General: Skin is warm and dry.   Neurological:      Mental Status: She is alert and oriented to person, place, and time. Mental status is at baseline.   Psychiatric:         Mood and Affect: Mood normal.         Thought Content: Thought content normal.         Judgment: Judgment normal.          The following " data was reviewed by: SAADIA Chirinos on 01/22/2024:  Common labs          1/22/2024    15:33   Common Labs   Glucose 89    BUN 11    Creatinine 0.72    Sodium 138    Potassium 4.1    Chloride 100    Calcium 10.1    Total Protein 7.2    Albumin 5.2    Total Bilirubin 0.5    Alkaline Phosphatase 78    AST (SGOT) 19    ALT (SGPT) 16    WBC 5.43    Hemoglobin 14.0    Hematocrit 42.4    Platelets 247    Total Cholesterol 253    Triglycerides 53    HDL Cholesterol 65    LDL Cholesterol  180    Hemoglobin A1C 5.30    .CT Chest Without Contrast Diagnostic (08/09/2023 12:52 PM)     Tobacco Use: Low Risk  (1/22/2024)    Patient History     Smoking Tobacco Use: Never     Smokeless Tobacco Use: Never     Passive Exposure: Never     Social History     Substance and Sexual Activity   Alcohol Use Yes    Alcohol/week: 1.0 standard drink of alcohol    Types: 1 Drinks containing 0.5 oz of alcohol per week    Comment: or less      PHQ-9 Depression Screening  Little interest or pleasure in doing things? 0-->not at all   Feeling down, depressed, or hopeless? 0-->not at all   Trouble falling or staying asleep, or sleeping too much?  0   Feeling tired or having little energy?  0   Poor appetite or overeating?  0   Feeling bad about yourself - or that you are a failure or have let yourself or your family down?  0   Trouble concentrating on things, such as reading the newspaper or watching television?  0   Moving or speaking so slowly that other people could have noticed? Or the opposite - being so fidgety or restless that you have been moving around a lot more than usual?  0   Thoughts that you would be better off dead, or of hurting yourself in some way?  0   PHQ-9 Total Score 0   If you checked off any problems, how difficult have these problems made it for you to do your work, take care of things at home, or get along with other people?  0                 Assessment and Plan   Diagnoses and all orders for this visit:    1. Medicare  annual wellness visit, initial (Primary)  -     CBC & Differential  -     Comprehensive metabolic panel    2. Palpitations  Assessment & Plan:  Stable on metoprolol  Continue with metoprolol  Denies any red flag symptoms  Following with cardiology    Orders:  -     metoprolol succinate XL (TOPROL-XL) 25 MG 24 hr tablet; Take a half tablet by mouth every day.  Dispense: 45 tablet; Refill: 3    3. Impaired fasting glucose  -     Hemoglobin A1c    4. Mixed hyperlipidemia  Assessment & Plan:  Lipid abnormalities are unchanged.  Nutritional counseling was provided.  Lipids will be reassessed in 1 year.    Orders:  -     TSH Rfx On Abnormal To Free T4  -     Lipid panel    5. Screening mammogram for breast cancer  -     Mammo screening digital tomosynthesis bilateral w CAD; Future    6. Migraine without aura and without status migrainosus, not intractable  Assessment & Plan:  Headaches are improving with treatment.  Continue current treatment regimen.  Cont with excedrin PRN      7. Lung nodule  Assessment & Plan:  Has CT scheduled       8. Vaccine counseling [Z71.85]    9. Depression screening negative [Z13.31]             Follow Up   Return in about 1 year (around 1/22/2025) for Medicare Wellness.  Patient was given instructions and counseling regarding her condition or for health maintenance advice. Please see specific information pulled into the AVS if appropriate.

## 2024-01-22 NOTE — ASSESSMENT & PLAN NOTE
Headaches are improving with treatment.  Continue current treatment regimen.  Cont with excedrin PRN

## 2024-01-23 LAB
ALBUMIN SERPL-MCNC: 5.2 G/DL (ref 3.5–5.2)
ALBUMIN/GLOB SERPL: 2.6 G/DL
ALP SERPL-CCNC: 78 U/L (ref 39–117)
ALT SERPL-CCNC: 16 U/L (ref 1–33)
AST SERPL-CCNC: 19 U/L (ref 1–32)
BASOPHILS # BLD AUTO: 0.05 10*3/MM3 (ref 0–0.2)
BASOPHILS NFR BLD AUTO: 0.9 % (ref 0–1.5)
BILIRUB SERPL-MCNC: 0.5 MG/DL (ref 0–1.2)
BUN SERPL-MCNC: 11 MG/DL (ref 8–23)
BUN/CREAT SERPL: 15.3 (ref 7–25)
CALCIUM SERPL-MCNC: 10.1 MG/DL (ref 8.6–10.5)
CHLORIDE SERPL-SCNC: 100 MMOL/L (ref 98–107)
CHOLEST SERPL-MCNC: 253 MG/DL (ref 0–200)
CO2 SERPL-SCNC: 26.5 MMOL/L (ref 22–29)
CREAT SERPL-MCNC: 0.72 MG/DL (ref 0.57–1)
EGFRCR SERPLBLD CKD-EPI 2021: 92.9 ML/MIN/1.73
EOSINOPHIL # BLD AUTO: 0.05 10*3/MM3 (ref 0–0.4)
EOSINOPHIL NFR BLD AUTO: 0.9 % (ref 0.3–6.2)
ERYTHROCYTE [DISTWIDTH] IN BLOOD BY AUTOMATED COUNT: 12.3 % (ref 12.3–15.4)
GLOBULIN SER CALC-MCNC: 2 GM/DL
GLUCOSE SERPL-MCNC: 89 MG/DL (ref 65–99)
HBA1C MFR BLD: 5.3 % (ref 4.8–5.6)
HCT VFR BLD AUTO: 42.4 % (ref 34–46.6)
HDLC SERPL-MCNC: 65 MG/DL (ref 40–60)
HGB BLD-MCNC: 14 G/DL (ref 12–15.9)
IMM GRANULOCYTES # BLD AUTO: 0.02 10*3/MM3 (ref 0–0.05)
IMM GRANULOCYTES NFR BLD AUTO: 0.4 % (ref 0–0.5)
LDLC SERPL CALC-MCNC: 180 MG/DL (ref 0–100)
LYMPHOCYTES # BLD AUTO: 1.65 10*3/MM3 (ref 0.7–3.1)
LYMPHOCYTES NFR BLD AUTO: 30.4 % (ref 19.6–45.3)
MCH RBC QN AUTO: 28.3 PG (ref 26.6–33)
MCHC RBC AUTO-ENTMCNC: 33 G/DL (ref 31.5–35.7)
MCV RBC AUTO: 85.7 FL (ref 79–97)
MONOCYTES # BLD AUTO: 0.33 10*3/MM3 (ref 0.1–0.9)
MONOCYTES NFR BLD AUTO: 6.1 % (ref 5–12)
NEUTROPHILS # BLD AUTO: 3.33 10*3/MM3 (ref 1.7–7)
NEUTROPHILS NFR BLD AUTO: 61.3 % (ref 42.7–76)
PLATELET # BLD AUTO: 247 10*3/MM3 (ref 140–450)
POTASSIUM SERPL-SCNC: 4.1 MMOL/L (ref 3.5–5.2)
PROT SERPL-MCNC: 7.2 G/DL (ref 6–8.5)
RBC # BLD AUTO: 4.95 10*6/MM3 (ref 3.77–5.28)
SODIUM SERPL-SCNC: 138 MMOL/L (ref 136–145)
TRIGL SERPL-MCNC: 53 MG/DL (ref 0–150)
TSH SERPL DL<=0.005 MIU/L-ACNC: 1.34 UIU/ML (ref 0.27–4.2)
VLDLC SERPL CALC-MCNC: 8 MG/DL (ref 5–40)
WBC # BLD AUTO: 5.43 10*3/MM3 (ref 3.4–10.8)

## 2024-02-12 ENCOUNTER — HOSPITAL ENCOUNTER (OUTPATIENT)
Dept: MAMMOGRAPHY | Facility: HOSPITAL | Age: 64
Discharge: HOME | End: 2024-02-12
Admitting: NURSE PRACTITIONER
Payer: MEDICARE

## 2024-02-12 DIAGNOSIS — Z12.31 SCREENING MAMMOGRAM FOR BREAST CANCER: ICD-10-CM

## 2024-02-12 PROCEDURE — 77063 BREAST TOMOSYNTHESIS BI: CPT

## 2024-02-12 PROCEDURE — 77067 SCR MAMMO BI INCL CAD: CPT

## 2024-03-06 ENCOUNTER — HOSPITAL ENCOUNTER (OUTPATIENT)
Dept: BONE DENSITY | Facility: HOSPITAL | Age: 64
Discharge: HOME | End: 2024-03-06
Admitting: NURSE PRACTITIONER
Payer: MEDICARE

## 2024-03-06 DIAGNOSIS — M25.462 EFFUSION OF LEFT KNEE: Primary | ICD-10-CM

## 2024-03-06 DIAGNOSIS — M25.562 MECHANICAL KNEE PAIN, LEFT: ICD-10-CM

## 2024-03-06 DIAGNOSIS — Z78.0 POST-MENOPAUSAL: ICD-10-CM

## 2024-03-06 PROCEDURE — 77080 DXA BONE DENSITY AXIAL: CPT

## 2024-03-21 ENCOUNTER — OFFICE VISIT (OUTPATIENT)
Dept: OBSTETRICS AND GYNECOLOGY | Age: 64
End: 2024-03-21
Payer: MEDICARE

## 2024-03-21 VITALS
HEIGHT: 63 IN | WEIGHT: 140 LBS | BODY MASS INDEX: 24.8 KG/M2 | DIASTOLIC BLOOD PRESSURE: 72 MMHG | SYSTOLIC BLOOD PRESSURE: 124 MMHG

## 2024-03-21 DIAGNOSIS — Z01.419 WELL WOMAN EXAM WITH ROUTINE GYNECOLOGICAL EXAM: Primary | ICD-10-CM

## 2024-03-21 DIAGNOSIS — Z78.0 POSTMENOPAUSAL: ICD-10-CM

## 2024-03-21 DIAGNOSIS — N81.2 CYSTOCELE WITH SECOND DEGREE UTERINE PROLAPSE: ICD-10-CM

## 2024-03-21 DIAGNOSIS — Z12.4 SCREENING FOR CERVICAL CANCER: ICD-10-CM

## 2024-03-21 NOTE — PROGRESS NOTES
Subjective     Chief Complaint   Patient presents with    Gynecologic Exam     New GYN, annual, last pap 2016,  mg 2024, dexa 2024, csy 2018  Cc:  prolapsed bladder/uterus       History of Present Illness    Jasmin Ureña is a 64 y.o.  who presents for annual exam.    New GYN  Due for pap smear, thinks she had an abnormal pap about 10 years ago, f/u normal   Colonoscopy in 2018, due again in 2 years  Dexa UTD - had osteopenia, started on vitamin D  She is postmenopausal - no vaginal bleeding or HRT  She notices when she's in the shower, jumping or walking a long time she will notice a protrusion from her vagina  She has regular bowel movements, occl constipation   She does note some urinary urgency at times but no incontinence   She is SA with her , no problems with IC  Soc -retired, was previously a respiratory therapist     Obstetric History:  OB History          2    Para   2    Term   2            AB        Living             SAB        IAB        Ectopic        Molar        Multiple        Live Births                   Menstrual History:     No LMP recorded. Patient is postmenopausal.         Current contraception: post menopausal status  History of abnormal Pap smear: no  Received Gardasil immunization: no  Perform regular self breast exam: yes - .  Family history of uterine or ovarian cancer: no  Family History of colon cancer: no  Family history of breast cancer: no    Mammogram: up to date.  Colonoscopy: up to date.  DEXA: up to date.    Exercise: moderately active  Calcium/Vitamin D: adequate intake    The following portions of the patient's history were reviewed and updated as appropriate: allergies, current medications, past family history, past medical history, past social history, past surgical history, and problem list.    Review of Systems   Constitutional: Negative.    Respiratory: Negative.     Cardiovascular: Negative.    Gastrointestinal: Negative.     Genitourinary: Negative.         Vaginal bulge   Skin: Negative.    Psychiatric/Behavioral: Negative.             Objective   Physical Exam  Constitutional:       General: She is awake.      Appearance: Normal appearance. She is well-developed.   HENT:      Head: Normocephalic and atraumatic.      Nose: Nose normal.   Neck:      Thyroid: No thyroid mass, thyromegaly or thyroid tenderness.   Cardiovascular:      Rate and Rhythm: Normal rate and regular rhythm.      Pulses: Normal pulses.      Heart sounds: Normal heart sounds.   Pulmonary:      Effort: Pulmonary effort is normal.      Breath sounds: Normal breath sounds.   Chest:   Breasts:     Breasts are symmetrical.      Right: Normal. No swelling, bleeding, inverted nipple, mass, nipple discharge, skin change or tenderness.      Left: Normal. No swelling, bleeding, inverted nipple, mass, nipple discharge, skin change or tenderness.   Abdominal:      General: Abdomen is flat. Bowel sounds are normal.      Palpations: Abdomen is soft.      Tenderness: There is no abdominal tenderness.   Genitourinary:     General: Normal vulva.      Labia:         Right: No rash, tenderness, lesion or injury.         Left: No rash, tenderness, lesion or injury.       Urethra: No prolapse, urethral pain, urethral swelling or urethral lesion.      Vagina: Normal. No signs of injury. No vaginal discharge, erythema, tenderness, bleeding, lesions or prolapsed vaginal walls.      Cervix: Normal. No discharge, friability, lesion, erythema or cervical bleeding.      Uterus: Normal. With uterine prolapse. Not enlarged and not tender.       Adnexa: Right adnexa normal and left adnexa normal.        Right: No mass, tenderness or fullness.          Left: No mass, tenderness or fullness.        Rectum: Normal. No mass.      Comments: Grade II bladder and uterine prolapse  Musculoskeletal:      Cervical back: Normal range of motion and neck supple.   Lymphadenopathy:      Upper Body:       "Right upper body: No supraclavicular adenopathy.      Left upper body: No supraclavicular adenopathy.   Skin:     General: Skin is warm and dry.   Neurological:      General: No focal deficit present.      Mental Status: She is alert and oriented to person, place, and time.   Psychiatric:         Mood and Affect: Mood normal.         Behavior: Behavior normal. Behavior is cooperative.         Thought Content: Thought content normal.         Judgment: Judgment normal.         /72   Ht 160 cm (63\")   Wt 63.5 kg (140 lb)   BMI 24.80 kg/m²     Assessment & Plan   Diagnoses and all orders for this visit:    1. Well woman exam with routine gynecological exam (Primary)    2. Screening for cervical cancer  -     IGP, Apt HPV,rfx 16 / 18,45    3. Postmenopausal    4. Cystocele with second degree uterine prolapse  -     Ambulatory Referral to Physical Therapy Pelvic Floor        All questions answered.  Breast self exam technique reviewed and patient encouraged to perform self-exam monthly.  Discussed healthy lifestyle modifications.  Recommended 30 minutes of aerobic exercise five times per week.  Discussed calcium needs to prevent osteoporosis.    -Will try pelvic PT, if no improvement or symptoms worsen recommend eval by uro/gyn               "

## 2024-03-25 ENCOUNTER — HOSPITAL ENCOUNTER (OUTPATIENT)
Dept: MRI IMAGING | Facility: HOSPITAL | Age: 64
Discharge: HOME | End: 2024-03-25
Admitting: ORTHOPAEDIC SURGERY
Payer: MEDICARE

## 2024-03-25 DIAGNOSIS — M25.462 EFFUSION OF LEFT KNEE: ICD-10-CM

## 2024-03-25 DIAGNOSIS — M25.562 MECHANICAL KNEE PAIN, LEFT: ICD-10-CM

## 2024-03-25 LAB
CYTOLOGIST CVX/VAG CYTO: NORMAL
CYTOLOGY CVX/VAG DOC CYTO: NORMAL
CYTOLOGY CVX/VAG DOC THIN PREP: NORMAL
DX ICD CODE: NORMAL
HPV I/H RISK 4 DNA CVX QL PROBE+SIG AMP: NEGATIVE
Lab: NORMAL
OTHER STN SPEC: NORMAL
STAT OF ADQ CVX/VAG CYTO-IMP: NORMAL

## 2024-03-25 PROCEDURE — 73721 MRI JNT OF LWR EXTRE W/O DYE: CPT

## 2024-04-18 ENCOUNTER — OFFICE VISIT (OUTPATIENT)
Dept: ORTHOPEDIC SURGERY | Facility: CLINIC | Age: 64
End: 2024-04-18
Payer: MEDICARE

## 2024-04-18 VITALS — BODY MASS INDEX: 24.8 KG/M2 | HEIGHT: 63 IN | WEIGHT: 140 LBS

## 2024-04-18 DIAGNOSIS — M25.562 MECHANICAL KNEE PAIN, LEFT: ICD-10-CM

## 2024-04-18 DIAGNOSIS — S83.271A COMPLEX TEAR OF LATERAL MENISCUS OF RIGHT KNEE AS CURRENT INJURY, INITIAL ENCOUNTER: ICD-10-CM

## 2024-04-18 DIAGNOSIS — S83.231A COMPLEX TEAR OF MEDIAL MENISCUS OF RIGHT KNEE AS CURRENT INJURY, INITIAL ENCOUNTER: ICD-10-CM

## 2024-04-18 PROCEDURE — 1160F RVW MEDS BY RX/DR IN RCRD: CPT | Performed by: ORTHOPAEDIC SURGERY

## 2024-04-18 PROCEDURE — 20610 DRAIN/INJ JOINT/BURSA W/O US: CPT | Performed by: ORTHOPAEDIC SURGERY

## 2024-04-18 PROCEDURE — 99214 OFFICE O/P EST MOD 30 MIN: CPT | Performed by: ORTHOPAEDIC SURGERY

## 2024-04-18 PROCEDURE — 1159F MED LIST DOCD IN RCRD: CPT | Performed by: ORTHOPAEDIC SURGERY

## 2024-04-18 RX ADMIN — LIDOCAINE HYDROCHLORIDE 8 ML: 10 INJECTION, SOLUTION EPIDURAL; INFILTRATION; INTRACAUDAL; PERINEURAL at 09:55

## 2024-04-18 RX ADMIN — TRIAMCINOLONE ACETONIDE 80 MG: 40 INJECTION, SUSPENSION INTRA-ARTICULAR; INTRAMUSCULAR at 09:55

## 2024-04-18 NOTE — PROGRESS NOTES
Subjective:     Patient ID: Jasmin Ureña is a 64 y.o. female.    Chief Complaint:  Follow-up left knee pain, follow-up MRI results    History of Present Illness  Jasmin Ureña returns to clinic today for evaluation of left knee, states that her knee has actually been doing slightly better though she still does have some intermittent episodes where she gets a sharp catching sensation particular along the lateral and anterior lateral aspect of her left knee.  She does not describe it distinctly as pain but does note soreness or aching sensation with these episodes.  They have been a little bit less frequent over the last 4 to 6 weeks.  Denies any aleyda locking of her knee denies any fevers chills or sweats.  Mild swelling that does wax and wane.  Exacerbation primarily with deep flexion and rotational activities.  Denies any hip or groin pain.     Social History     Occupational History    Not on file   Tobacco Use    Smoking status: Never     Passive exposure: Never    Smokeless tobacco: Never    Tobacco comments:     caffeine use 3 cups coffee daily   Vaping Use    Vaping status: Never Used   Substance and Sexual Activity    Alcohol use: Yes     Alcohol/week: 1.0 standard drink of alcohol     Types: 1 Drinks containing 0.5 oz of alcohol per week     Comment: or less    Drug use: Never    Sexual activity: Yes     Partners: Male     Birth control/protection: Post-menopausal      Past Medical History:   Diagnosis Date    Abnormal ECG 07/2022    Hyperlipidemia     Hypertension     Migraine     Narrow complex tachycardia     per 7-8-2022 [facility] ER visit     Past Surgical History:   Procedure Laterality Date    COLONOSCOPY  09/29/2016    FOOT SURGERY  1994       Family History   Problem Relation Age of Onset    Heart disease Mother         triple bypass    Hyperlipidemia Mother     Heart disease Father     Hyperlipidemia Father     Cancer Sister     Heart disease Sister         quadruple bypass     "Coronary artery disease Sister     Hyperlipidemia Sister     Heart disease Brother     Coronary artery disease Brother     Cancer Brother         Unknown origin /     Heart disease Brother     Cancer Brother         Prostate    Heart disease Brother         Quadruple bypass    Hyperlipidemia Brother     Cancer Sister         Cervical/    Heart disease Brother         quadruple bypass    Hyperlipidemia Brother     Heart disease Brother         Triple bypass    Hyperlipidemia Brother     Other Brother         Parkinsons/         Review of Systems        Objective:  Vitals:    24 0846   Weight: 63.5 kg (140 lb)   Height: 160 cm (63\")         24  0846   Weight: 63.5 kg (140 lb)     Body mass index is 24.8 kg/m².  Physical Exam    Vital signs reviewed.   General: No acute distress, alert and oriented  Eyes: conjunctiva clear; pupils equally round and reactive  ENT: external ears and nose atraumatic; oropharynx clear  CV: no peripheral edema  Resp: normal respiratory effort  Skin: no rashes or wounds; normal turgor  Psych: mood and affect appropriate; recent and remote memory intact        Ortho Exam     Left knee-active range of motion 0 to 135 degrees, 4+ out of 5 strength on flexion and extension, mild tenderness along lateral patella facet, moderate tenderness along medial joint line, most of the tenderness is located to the lateral joint line with mildly positive Saud exam.  Stable to varus and valgus stress at 0 and 30 degrees.    Imaging:    MRI Knee Left Without Contrast    Result Date: 3/25/2024  1. Large horizontal tear anterior horn and body lateral meniscus with partial lateral extrusion lateral meniscal body. 2. Small oblique tear medial meniscus extending undersurface of the junction of the posterior and body. 3. Moderate patellofemoral arthritis with lateral tilt of the patella and cartilage thinning at the lateral facet. Mild medial lateral compartment articular " cartilage thinning. 4. Joint effusion with large Baker's cyst that contains an 8 mm cartilage body.  This report was finalized on 3/25/2024 12:31 PM by Dr. Boo Rogers M.D on Workstation: [id number]      Reviewed outside MRI left knee include reviewed imaging as well as radiology report indicates large horizontal oblique tear of anterior horn and body lateral meniscus with small oblique tear of medial meniscus.  Moderate patellofemoral arthritis noted with mild chondral thinning in medial compartment.      Assessment:        1. Mechanical knee pain, left    2. Complex tear of medial meniscus of right knee as current injury, initial encounter    3. Complex tear of lateral meniscus of right knee as current injury, initial encounter           Plan:    - Large Joint Arthrocentesis: L knee on 4/18/2024 9:55 AM  Indications: pain  Details: 22 G needle, anterolateral approach  Medications: 8 mL lidocaine PF 1% 1 %; 80 mg triamcinolone acetonide 40 MG/ML  Outcome: tolerated well, no immediate complications  Procedure, treatment alternatives, risks and benefits explained, specific risks discussed. Consent was given by the patient. Immediately prior to procedure a time out was called to verify the correct patient, procedure, equipment, support staff and site/side marked as required. Patient was prepped and draped in the usual sterile fashion.               Discussed treatment options at length with patient at today's visit.  Reviewed with patient findings from MRI indicating meniscal pathology.  She really is not having too much in regards to pain with her knee.  Her main issue had been the mechanical episodes where she felt a popping sensation to her knee, really does not describe as a specific catch or event lock.  I discussed with her that this may be from some underlying chondral wear of her patellofemoral joint but also she does have evidence of a fairly large lateral meniscal tear that could be causing some  occasional impingement episodes.  We discussed options at length including observation, injection, and arthroscopy.  She would like to proceed with injection today to see if this can help relieve some of the painful impingement episodes that she does have on occasion.  Encouraged range of motion and strengthening is much as possible  Follow up: As needed-May consider proceeding with arthroscopy if unsuccessful improvement with injection and continues to have issues with her knee.      Jasmin Ureña was in agreement with plan and had all questions answered.     Orders:  No orders of the defined types were placed in this encounter.      Medications:  No orders of the defined types were placed in this encounter.      Followup:  No follow-ups on file.    Diagnoses and all orders for this visit:    1. Mechanical knee pain, left (Primary)    2. Complex tear of medial meniscus of right knee as current injury, initial encounter    3. Complex tear of lateral meniscus of right knee as current injury, initial encounter          Dictated utilizing Dragon dictation

## 2024-04-22 RX ORDER — TRIAMCINOLONE ACETONIDE 40 MG/ML
80 INJECTION, SUSPENSION INTRA-ARTICULAR; INTRAMUSCULAR
Status: COMPLETED | OUTPATIENT
Start: 2024-04-18 | End: 2024-04-18

## 2024-04-22 RX ORDER — LIDOCAINE HYDROCHLORIDE 10 MG/ML
8 INJECTION, SOLUTION EPIDURAL; INFILTRATION; INTRACAUDAL; PERINEURAL
Status: COMPLETED | OUTPATIENT
Start: 2024-04-18 | End: 2024-04-18

## 2024-04-30 ENCOUNTER — TELEPHONE (OUTPATIENT)
Dept: OBSTETRICS AND GYNECOLOGY | Age: 64
End: 2024-04-30
Payer: MEDICARE

## 2024-04-30 NOTE — TELEPHONE ENCOUNTER
SAIRA WITH [facility] CALLED REQUESTING A NEW REFERRAL. REFERRAL IN CHART IS FROM 03/21/24. PATIENT IS SCHEDULED FOR 05/08/24. SAIRA STATED REFERRALS ARE ONLY VALID FOR 30 DAYS. REQUESTING A NEW REFERRAL FAXED [phone].-[facility]

## 2024-05-01 DIAGNOSIS — N81.2 CYSTOCELE WITH SECOND DEGREE UTERINE PROLAPSE: Primary | ICD-10-CM

## 2024-05-01 NOTE — TELEPHONE ENCOUNTER
Fax failed, not sure if they gave us the right fax #.    I re-faxed to [phone] which is the number we usually use.

## 2024-05-21 ENCOUNTER — OFFICE VISIT (OUTPATIENT)
Dept: CARDIOLOGY | Facility: CLINIC | Age: 64
End: 2024-05-21
Payer: MEDICARE

## 2024-05-21 VITALS
BODY MASS INDEX: 24.8 KG/M2 | WEIGHT: 140 LBS | HEART RATE: 61 BPM | HEIGHT: 63 IN | SYSTOLIC BLOOD PRESSURE: 130 MMHG | DIASTOLIC BLOOD PRESSURE: 80 MMHG

## 2024-05-21 DIAGNOSIS — R00.2 PALPITATIONS: Primary | ICD-10-CM

## 2024-05-21 NOTE — PROGRESS NOTES
"      CARDIOLOGY    Lobo Bonner MD    ENCOUNTER DATE:  05/21/2024    Jasmin Ureña / 64 y.o. / female        CHIEF COMPLAINT / REASON FOR OFFICE VISIT     Palpitations (04/27/2023 Follow up)      HISTORY OF PRESENT ILLNESS       Palpitations       Jasmin Ureña is a 64 y.o. female who presents today for reevaluation.  Overall she has been doing well.  Couple weeks ago she had some increasing palpitations but she said this was associated with a illness.  She was on a long bike ride and said afterward took a while for her heart rate to come down.  I questioned if maybe she was little bit dehydrated she has not had any further issues since.  Overall she has been very stable no chest pains or shortness of breath.      The following portions of the patient's history were reviewed and updated as appropriate: allergies, current medications, past family history, past medical history, past social history, past surgical history and problem list.      VITAL SIGNS     Visit Vitals  /80 (BP Location: Left arm)   Pulse 61   Ht 160 cm (63\")   Wt 63.5 kg (140 lb)   BMI 24.80 kg/m²         Wt Readings from Last 3 Encounters:   05/21/24 63.5 kg (140 lb)   04/18/24 63.5 kg (140 lb)   03/21/24 63.5 kg (140 lb)     Body mass index is 24.8 kg/m².      REVIEW OF SYSTEMS   Review of Systems   Cardiovascular:  Positive for palpitations.           PHYSICAL EXAMINATION     Vitals reviewed.   Constitutional:       Appearance: Healthy appearance.   Pulmonary:      Effort: Pulmonary effort is normal.   Cardiovascular:      Normal rate. Regular rhythm. Normal S1. Normal S2.       Murmurs: There is no murmur.      No gallop.  No click. No rub.   Pulses:     Intact distal pulses.   Edema:     Peripheral edema absent.           REVIEWED DATA       ECG 12 Lead    Date/Time: 5/21/2024 2:48 PM  Performed by: Lobo oBnner MD    Authorized by: Lobo Bonner MD  Comparison: compared with previous ECG from " 4/27/2023  Similar to previous ECG  Rhythm: sinus rhythm  Conduction comments: RSR prime V1 and V2    Clinical impression: non-specific ECG          Cardiac Procedures:      Lipid Panel          1/22/2024    15:33 4/24/2024    09:10   Lipid Panel   Total Cholesterol 253  151    Triglycerides 53  83    HDL Cholesterol 65  53    VLDL Cholesterol 8  16    LDL Cholesterol  180  82          ASSESSMENT & PLAN      Diagnosis Plan   1. Palpitations              SUMMARY/DISCUSSION  Palpitations patient did have episode while ill which is very much expected.  Otherwise she has been doing great.  Will continue the same follow-up in 2 years.        MEDICATIONS         Discharge Medications            Accurate as of May 21, 2024  2:47 PM. If you have any questions, ask your nurse or doctor.                Continue These Medications        Instructions Start Date   aspirin-acetaminophen-caffeine 250-250-65 MG per tablet  Commonly known as: EXCEDRIN MIGRAINE   1 tablet, Oral, Every 6 Hours PRN      atorvastatin 20 MG tablet  Commonly known as: LIPITOR   20 mg, Oral, Daily      metoprolol succinate XL 25 MG 24 hr tablet  Commonly known as: TOPROL-XL   Take a half tablet by mouth every day.                   **Dragon Disclaimer:   Much of this encounter note is an electronic transcription/translation of spoken language to printed text. The electronic translation of spoken language may permit erroneous, or at times, nonsensical words or phrases to be inadvertently transcribed. Although I have reviewed the note for such errors, some may still exist.

## 2024-05-31 ENCOUNTER — TELEPHONE (OUTPATIENT)
Dept: ORTHOPEDIC SURGERY | Facility: CLINIC | Age: 64
End: 2024-05-31
Payer: MEDICARE

## 2024-05-31 NOTE — TELEPHONE ENCOUNTER
Patient had a left knee injection on 4/18/24.  States that her pain and swelling have returned as well as decreased range of motion.  She has been applying ice and resting but no improvement.      Discussed treatment options at length with patient at today's visit.  Reviewed with patient findings from MRI indicating meniscal pathology.  She really is not having too much in regards to pain with her knee.  Her main issue had been the mechanical episodes where she felt a popping sensation to her knee, really does not describe as a specific catch or event lock.  I discussed with her that this may be from some underlying chondral wear of her patellofemoral joint but also she does have evidence of a fairly large lateral meniscal tear that could be causing some occasional impingement episodes.  We discussed options at length including observation, injection, and arthroscopy.  She would like to proceed with injection today to see if this can help relieve some of the painful impingement episodes that she does have on occasion.  Encouraged range of motion and strengthening is much as possible  Follow up: As needed-May consider proceeding with arthroscopy if unsuccessful improvement with injection and continues to have issues with her knee.    Is there anything else she can try or do we need to make a follow up?

## 2024-06-03 RX ORDER — CELECOXIB 200 MG/1
200 CAPSULE ORAL 2 TIMES DAILY
Qty: 60 CAPSULE | Refills: 0 | Status: SHIPPED | OUTPATIENT
Start: 2024-06-03

## 2024-06-03 RX ORDER — METHYLPREDNISOLONE 4 MG/1
TABLET ORAL
Qty: 1 EACH | Refills: 0 | Status: SHIPPED | OUTPATIENT
Start: 2024-06-03

## 2024-06-04 ENCOUNTER — TELEPHONE (OUTPATIENT)
Dept: OBSTETRICS AND GYNECOLOGY | Age: 64
End: 2024-06-04

## 2024-06-04 NOTE — TELEPHONE ENCOUNTER
[facility] staff attempted to follow warm transfer process and was unsuccessful     Caller: MISSY    Relationship to patient: Other - [facility]    Best call back number: [phone]    Patient is needing: [facility] IS NEEDING TO HAVE THE PLAN OF CARE DATED 5/14/24 FAXED TO THEM ASAP TO FAX# [phone] - BILLING - ATTEN: MISSY VYAS    OTHER FAX : [phone]  [facility]    THIS WAS TO HAVE BEEN FAXED OVER ALREADY - BUT [facility] HAS NOT RECEIVED IT AND THEY NEED TO HAVE THIS PLAN OF CARE FOR THE PT TO RELEASE THE CHARGES    MISSY STATED SHE WOULD SIT ON THE PHONE WITH SOMEONE WHILE THIS IS BEING FAXED TO CONFIRM IT IS RECEIVED THIS TIME.  YOU CAN REACH MISSY AT THE ABOVE NUMBER

## 2024-07-01 RX ORDER — CELECOXIB 200 MG/1
200 CAPSULE ORAL 2 TIMES DAILY
Qty: 60 CAPSULE | Refills: 0 | Status: SHIPPED | OUTPATIENT
Start: 2024-07-01

## 2024-07-01 NOTE — TELEPHONE ENCOUNTER
Rx Refill Note  Requested Prescriptions     Pending Prescriptions Disp Refills    celecoxib (CeleBREX) 200 MG capsule [Pharmacy Med Name: CELECOXIB 200 MG CAPSULE] 60 capsule 0     Sig: TAKE 1 CAPSULE BY MOUTH TWICE A DAY      Last office visit with prescribing clinician: 4/18/2024      Next office visit with prescribing clinician: 7/11/2024   Last Filled:6/3/24    Mechanical knee pain, left     2. Complex tear of medial meniscus of right knee as current injury, initial encounter    3. Complex tear of lateral meniscus of right knee as current injury, initial encounter       Date of Surgery:      Bijal Neff MA  07/01/24, 09:09 EDT    Previous RX pended for your approval, change or denial.     {TIP  Encounters:    {TIP  Please add Last Relevant Lab Date if appropriate:  {TIP  Is Refill Pharmacy correct?:

## 2024-07-11 ENCOUNTER — OFFICE VISIT (OUTPATIENT)
Dept: ORTHOPEDIC SURGERY | Facility: CLINIC | Age: 64
End: 2024-07-11
Payer: MEDICARE

## 2024-07-11 VITALS — HEIGHT: 63 IN | BODY MASS INDEX: 24.8 KG/M2 | WEIGHT: 140 LBS

## 2024-07-11 DIAGNOSIS — S83.272D COMPLEX TEAR OF LATERAL MENISCUS OF LEFT KNEE AS CURRENT INJURY, SUBSEQUENT ENCOUNTER: ICD-10-CM

## 2024-07-11 DIAGNOSIS — M25.562 MECHANICAL KNEE PAIN, LEFT: Primary | ICD-10-CM

## 2024-07-11 DIAGNOSIS — S83.232D COMPLEX TEAR OF MEDIAL MENISCUS OF LEFT KNEE AS CURRENT INJURY, SUBSEQUENT ENCOUNTER: ICD-10-CM

## 2024-07-11 PROCEDURE — 99212 OFFICE O/P EST SF 10 MIN: CPT | Performed by: ORTHOPAEDIC SURGERY

## 2024-07-11 NOTE — PROGRESS NOTES
Subjective:     Patient ID: Jasmin Ureña is a 64 y.o. female.    Chief Complaint:  Follow up left knee pain, medial and lateral meniscal tears    History of Present Illness  History of Present Illness  The patient returns to clinic today for follow-up evaluation in regards to her left knee.    The patient reports a slight improvement in her left knee condition, attributing this improvement to the use of Celebrex. However, she continues to experience some tightness in her knee, particularly during maximal extension and deep flexion activities. She also experiences intermittent pain, particularly over the anterolateral aspect of her knee, accompanied by mild crepitus. She denies any aleyda locking or catching sensations. Additionally, she reports some tightness posteriorly in the region of her Baker's cyst.     Social History     Occupational History    Not on file   Tobacco Use    Smoking status: Never     Passive exposure: Never    Smokeless tobacco: Never    Tobacco comments:     caffeine use 3 cups coffee daily   Vaping Use    Vaping status: Never Used   Substance and Sexual Activity    Alcohol use: Yes     Alcohol/week: 1.0 standard drink of alcohol     Types: 1 Drinks containing 0.5 oz of alcohol per week     Comment: or less    Drug use: Never    Sexual activity: Yes     Partners: Male     Birth control/protection: Post-menopausal      Past Medical History:   Diagnosis Date    Abnormal ECG 07/2022    Hyperlipidemia     Hypertension     Migraine     Narrow complex tachycardia     per 7-8-2022 [facility] ER visit    Tear of meniscus of knee 3/2024     Past Surgical History:   Procedure Laterality Date    COLONOSCOPY  09/29/2016    FOOT SURGERY  1994       Family History   Problem Relation Age of Onset    Heart disease Mother         triple bypass    Hyperlipidemia Mother     Heart disease Father     Hyperlipidemia Father     Cancer Sister     Heart disease Sister         quadruple bypass    Coronary  "artery disease Sister     Hyperlipidemia Sister     Heart disease Brother     Coronary artery disease Brother     Cancer Brother         Unknown origin /     Heart disease Brother     Cancer Brother         Prostate    Heart disease Brother         Quadruple bypass    Hyperlipidemia Brother     Cancer Sister         Cervical/    Heart disease Brother         quadruple bypass    Hyperlipidemia Brother     Heart disease Brother         Triple bypass    Hyperlipidemia Brother     Other Brother         Parkinsons/         Review of Systems        Objective:  Vitals:    24   Weight: 63.5 kg (140 lb)   Height: 160 cm (63\")         24   Weight: 63.5 kg (140 lb)     Body mass index is 24.8 kg/m².  Physical Exam    Vital signs reviewed.   General: No acute distress, alert and oriented  Eyes: conjunctiva clear; pupils equally round and reactive  ENT: external ears and nose atraumatic; oropharynx clear  CV: no peripheral edema  Resp: normal respiratory effort  Skin: no rashes or wounds; normal turgor  Psych: mood and affect appropriate; recent and remote memory intact      Physical Exam  Left knee- active range of motion is 0 to 130 degrees, 4+ out of 5 strength on flexion and extension, moderate effusion, stable to varus and valgus stress at 0 and 30 degrees, moderately positive active patellar compression test. Mild tenderness along lateral joint line with mildly positive Saud's exam, grade 1A Lachman, negative anterior and posterior drawer.         Imaging:  Left Knee X-Ray  Indication: Pain    AP, Lateral, and [place] views    Findings:  No fracture  No bony lesion  Normal soft tissues  Moderate lateral compartment joint space narrowing, slight valgus alignment    Compared to prior office xrays    Assessment:        1. Mechanical knee pain, left    2. Complex tear of medial meniscus of left knee as current injury, subsequent encounter    3. Complex tear of lateral meniscus " of left knee as current injury, subsequent encounter           Plan:          Assessment & Plan  1. Left knee follow-up.  The patient's MRI and x-rays from today were reviewed, revealing lateral joint space narrowing and degenerative changes to the lateral meniscus. Despite the absence of mechanical symptoms, an arthroscopic intervention is deemed unnecessary at this point. A comprehensive discussion was held with the patient regarding various treatment options. Currently, she perceives an improvement in her condition. She has successfully reduced her fluid accumulation and appears to be responding well to the Celebrex. The addition of glucosamine and chondroitin to her regimen is advised. The use of a knee sleeve may be considered. All her queries were addressed. Should she experience a recurrence of symptoms, a repeat injection may be considered, as she is now 3 months post her last injection.    Jasmin Ureña was in agreement with plan and had all questions answered.     Orders:  Orders Placed This Encounter   Procedures    XR Knee 3+ View With [place] Left       Medications:  No orders of the defined types were placed in this encounter.      Followup:  No follow-ups on file.    Diagnoses and all orders for this visit:    1. Mechanical knee pain, left (Primary)  -     XR Knee 3+ View With [place] Left    2. Complex tear of medial meniscus of left knee as current injury, subsequent encounter    3. Complex tear of lateral meniscus of left knee as current injury, subsequent encounter          Dictated utilizing Dragon dictation     Patient or patient representative verbalized consent for the use of Ambient Listening during the visit with  Brian Lomeli MD for chart documentation. 7/18/2024  09:43 EDT

## 2024-07-12 DIAGNOSIS — E78.2 MIXED HYPERLIPIDEMIA: ICD-10-CM

## 2024-07-12 RX ORDER — ATORVASTATIN CALCIUM 20 MG/1
20 TABLET, FILM COATED ORAL DAILY
Qty: 90 TABLET | Refills: 1 | Status: SHIPPED | OUTPATIENT
Start: 2024-07-12

## 2024-08-01 DIAGNOSIS — R91.1 LUNG NODULE: Primary | ICD-10-CM

## 2024-08-02 RX ORDER — CELECOXIB 200 MG/1
200 CAPSULE ORAL 2 TIMES DAILY
Qty: 60 CAPSULE | Refills: 0 | Status: SHIPPED | OUTPATIENT
Start: 2024-08-02

## 2024-08-02 NOTE — TELEPHONE ENCOUNTER
Rx Refill Note  Requested Prescriptions     Pending Prescriptions Disp Refills    celecoxib (CeleBREX) 200 MG capsule [Pharmacy Med Name: CELECOXIB 200 MG CAPSULE] 60 capsule 0     Sig: TAKE 1 CAPSULE BY MOUTH TWICE A DAY      Last office visit with prescribing clinician: Visit date not found      Next office visit with prescribing clinician: Visit date not found   Last Filled: 7/1/2024    Mechanical knee pain, left      2. Complex tear of medial meniscus of right knee as current injury, initial encounter    3. Complex tear of lateral meniscus of right knee as current injury, initial encounter          Norma Zuniga MA  08/02/24, 11:51 EDT    Previous RX pended for your approval, change or denial.     {TIP  Encounters:    {TIP  Please add Last Relevant Lab Date if appropriate:  {TIP  Is Refill Pharmacy correct?:

## 2024-08-22 ENCOUNTER — HOSPITAL ENCOUNTER (OUTPATIENT)
Dept: CT IMAGING | Facility: HOSPITAL | Age: 64
Discharge: HOME | End: 2024-08-22
Admitting: NURSE PRACTITIONER
Payer: MEDICARE

## 2024-08-22 DIAGNOSIS — R91.1 LUNG NODULE: ICD-10-CM

## 2024-08-22 PROCEDURE — 71250 CT THORAX DX C-: CPT

## 2025-01-12 DIAGNOSIS — E78.2 MIXED HYPERLIPIDEMIA: ICD-10-CM

## 2025-01-13 DIAGNOSIS — E78.2 MIXED HYPERLIPIDEMIA: ICD-10-CM

## 2025-01-13 RX ORDER — ATORVASTATIN CALCIUM 20 MG/1
20 TABLET, FILM COATED ORAL DAILY
Qty: 90 TABLET | Refills: 1 | OUTPATIENT
Start: 2025-01-13

## 2025-01-13 RX ORDER — ATORVASTATIN CALCIUM 20 MG/1
20 TABLET, FILM COATED ORAL DAILY
Qty: 90 TABLET | Refills: 0 | Status: SHIPPED | OUTPATIENT
Start: 2025-01-13

## 2025-02-27 ENCOUNTER — OFFICE VISIT (OUTPATIENT)
Dept: INTERNAL MEDICINE | Facility: CLINIC | Age: 65
End: 2025-02-27
Payer: MEDICARE

## 2025-02-27 VITALS
SYSTOLIC BLOOD PRESSURE: 122 MMHG | HEART RATE: 73 BPM | BODY MASS INDEX: 25.52 KG/M2 | WEIGHT: 144 LBS | OXYGEN SATURATION: 98 % | HEIGHT: 63 IN | DIASTOLIC BLOOD PRESSURE: 80 MMHG

## 2025-02-27 DIAGNOSIS — Z00.00 MEDICARE ANNUAL WELLNESS VISIT, SUBSEQUENT: Primary | ICD-10-CM

## 2025-02-27 DIAGNOSIS — R00.2 PALPITATIONS: Chronic | ICD-10-CM

## 2025-02-27 DIAGNOSIS — Z12.31 SCREENING MAMMOGRAM FOR BREAST CANCER: ICD-10-CM

## 2025-02-27 DIAGNOSIS — G43.009 MIGRAINE WITHOUT AURA AND WITHOUT STATUS MIGRAINOSUS, NOT INTRACTABLE: Chronic | ICD-10-CM

## 2025-02-27 DIAGNOSIS — E78.2 MIXED HYPERLIPIDEMIA: Chronic | ICD-10-CM

## 2025-02-27 DIAGNOSIS — M85.88 OSTEOPENIA OF LUMBAR SPINE: Chronic | ICD-10-CM

## 2025-02-27 PROCEDURE — 1126F AMNT PAIN NOTED NONE PRSNT: CPT | Performed by: NURSE PRACTITIONER

## 2025-02-27 PROCEDURE — 1160F RVW MEDS BY RX/DR IN RCRD: CPT | Performed by: NURSE PRACTITIONER

## 2025-02-27 PROCEDURE — 99214 OFFICE O/P EST MOD 30 MIN: CPT | Performed by: NURSE PRACTITIONER

## 2025-02-27 PROCEDURE — 1159F MED LIST DOCD IN RCRD: CPT | Performed by: NURSE PRACTITIONER

## 2025-02-27 PROCEDURE — G2211 COMPLEX E/M VISIT ADD ON: HCPCS | Performed by: NURSE PRACTITIONER

## 2025-02-27 PROCEDURE — G0439 PPPS, SUBSEQ VISIT: HCPCS | Performed by: NURSE PRACTITIONER

## 2025-02-27 RX ORDER — DIPHENOXYLATE HYDROCHLORIDE AND ATROPINE SULFATE 2.5; .025 MG/1; MG/1
TABLET ORAL DAILY
COMMUNITY

## 2025-02-27 NOTE — ASSESSMENT & PLAN NOTE
Doing well on metoprolol  Reports symptoms well controlled  Following with cardiology [id number]    Orders:    CBC & Differential    Comprehensive Metabolic Panel

## 2025-02-27 NOTE — ASSESSMENT & PLAN NOTE
Dexa due in 2026  Current recommendations according to the current Physical Activity Guidelines for Americans: adults need 150-300 minutes of physical exercise weekly. It is also recommended to perform two sessions of full body strength training exercise weekly which includes all major muscle groups including legs, hips, back, abdomen, chest, shoulders, and arms.   Dexa 3/2024  FINDINGS:  LUMBAR SPINE:  The BMD measured in L1-L4 is 0.796 g/cm2 for a T-score of  -2.3 and a Z-score of -0.5     LEFT HIP: The BMD for the femoral neck is 0.67 g/cm2 for a T score of  -1.5 and a Z score of 0.0     RIGHT HIP:  The BMD for the total hip is 0.804 g/cm2 for a T score of  -1.1 and a Z score of 0.1     IMPRESSION:  1.  Osteopenia.  2.  The 10-year FRAX (World Health Organization) fracture risk for a  major osteoporotic fracture is 9.3% and for a hip fracture is 1.2%.              Current recommendations are that a follow-up bone density be obtained at  an interval of not less than 2 years except in specific circumstances,  such as after initiation or change of therapy.     This report was finalized on 3/6/2024 1:13 PM by Dr. Boo Rogers M.D on Workstation: [id number]

## 2025-02-27 NOTE — ASSESSMENT & PLAN NOTE
Continues using excedrin migraine sparingly  Continues with avoiding food triggers and lifestyle modifications

## 2025-02-27 NOTE — PROGRESS NOTES
Subjective   The ABCs of the Annual Wellness Visit  Medicare Wellness Visit      Jasmin Ureña is a 64 y.o. patient who presents for a Medicare Wellness Visit.    The following portions of the patient's history were reviewed and   updated as appropriate: allergies, current medications, past family history, past medical history, past social history, past surgical history, and problem list.    Compared to one year ago, the patient's physical   health is the same.  Compared to one year ago, the patient's mental   health is the same.    Recent Hospitalizations:  She was not admitted to the hospital during the last year.     Current Medical Providers:  Patient Care Team:  Genoveva Loredo APRN as PCP - General (Internal Medicine)  Mayte Ness MD as Consulting Physician (Gynecology)  Lobo Bonner MD as Consulting Physician (Cardiology)  Brian Lomeli MD as Surgeon (Orthopedic Surgery)    Outpatient Medications Prior to Visit   Medication Sig Dispense Refill    aspirin-acetaminophen-caffeine (EXCEDRIN MIGRAINE) 250-250-65 MG per tablet Take 1 tablet by mouth Every 6 (Six) Hours As Needed for Headache.      atorvastatin (LIPITOR) 20 MG tablet Take 1 tablet by mouth Daily. 90 tablet 0    metoprolol succinate XL (TOPROL-XL) 25 MG 24 hr tablet Take a half tablet by mouth every day. 45 tablet 3    multivitamin (MULTIVITAMIN PO) Take  by mouth Daily.      celecoxib (CeleBREX) 200 MG capsule TAKE 1 CAPSULE BY MOUTH TWICE A DAY 60 capsule 0    methylPREDNISolone (MEDROL) 4 MG dose pack Take as directed on package instructions. 1 each 0     No facility-administered medications prior to visit.     No opioid medication identified on active medication list. I have reviewed chart for other potential  high risk medication/s and harmful drug interactions in the elderly.      Aspirin is not on active medication list.  Aspirin use is not indicated based on review of current medical condition/s. Risk of harm  "outweighs potential benefits.  .    Patient Active Problem List   Diagnosis    Migraine without aura and without status migrainosus, not intractable    Impaired fasting glucose    Palpitations    Mixed hyperlipidemia    Closed fracture of transverse process of thoracic vertebra    Closed fracture of transverse process of lumbar vertebra    Lung nodule    Cystocele with second degree uterine prolapse    Osteopenia of lumbar spine     Advance Care Planning Advance Directive is not on file.  ACP discussion was held with the patient during this visit. Patient does not have an advance directive, information provided.            Objective   Vitals:    02/27/25 0855 02/27/25 0930   BP: 138/90 122/80   BP Location: Left arm Left arm   Patient Position: Sitting Sitting   Cuff Size: Adult Adult   Pulse: 73    SpO2: 98%    Weight: 65.3 kg (144 lb)    Height: 160 cm (63\")    PainSc: 0-No pain        Estimated body mass index is 25.51 kg/m² as calculated from the following:    Height as of this encounter: 160 cm (63\").    Weight as of this encounter: 65.3 kg (144 lb).                Does the patient have evidence of cognitive impairment? No                                                                                                Health  Risk Assessment    Smoking Status:  Social History     Tobacco Use   Smoking Status Never    Passive exposure: Never   Smokeless Tobacco Never   Tobacco Comments    caffeine use 3 cups coffee daily     Alcohol Consumption:  Social History     Substance and Sexual Activity   Alcohol Use Yes    Alcohol/week: 1.0 standard drink of alcohol    Types: 1 Drinks containing 0.5 oz of alcohol per week    Comment: or less       Fall Risk Screen  STEADI Fall Risk Assessment was completed, and patient is at LOW risk for falls.Assessment completed on:2/27/2025    Depression Screening   Little interest or pleasure in doing things? Not at all   Feeling down, depressed, or hopeless? Not at all   PHQ-2 Total " Score 0      Health Habits and Functional and Cognitive Screenin/20/2025     9:44 AM   Functional & Cognitive Status   Do you have difficulty preparing food and eating? No    Do you have difficulty bathing yourself, getting dressed or grooming yourself? No    Do you have difficulty using the toilet? No    Do you have difficulty moving around from place to place? No    Do you have trouble with steps or getting out of a bed or a chair? No    Current Diet Well Balanced Diet    Dental Exam Up to date    Eye Exam Up to date    Exercise (times per week) 3 times per week    Current Exercises Include Bicycling Outdoors;Hiking;House Cleaning;Light Weights;Walking;Yard Work    Do you need help using the phone?  No    Are you deaf or do you have serious difficulty hearing?  No    Do you need help to go to places out of walking distance? No    Do you need help shopping? No    Do you need help preparing meals?  No    Do you need help with housework?  No    Do you need help with laundry? No    Do you need help taking your medications? No    Do you need help managing money? No    Do you ever drive or ride in a car without wearing a seat belt? No    Have you felt unusual stress, anger or loneliness in the last month? No    Who do you live with? Spouse    If you need help, do you have trouble finding someone available to you? No    Have you been bothered in the last four weeks by sexual problems? No    Do you have difficulty concentrating, remembering or making decisions? No        Patient-reported           Age-appropriate Screening Schedule:  Refer to the list below for future screening recommendations based on patient's age, sex and/or medical conditions. Orders for these recommended tests are listed in the plan section. The patient has been provided with a written plan.    Health Maintenance List  Health Maintenance   Topic Date Due    TDAP/TD VACCINES (1 - Tdap) 2025 (Originally 3/13/1979)    ZOSTER VACCINE (1 of  2) 02/27/2025 (Originally 3/13/2008)    COVID-19 Vaccine (1 - 2024-25 season) 03/01/2025 (Originally 9/1/2024)    INFLUENZA VACCINE  03/31/2025 (Originally 7/1/2024)    Pneumococcal Vaccine 50+ (1 of 1 - PCV) 02/27/2026 (Originally 3/13/2008)    LIPID PANEL  04/24/2025    BMI FOLLOWUP  05/01/2025    COLORECTAL CANCER SCREENING  01/01/2026    MAMMOGRAM  02/12/2026    ANNUAL WELLNESS VISIT  02/27/2026    DXA SCAN  03/06/2026    HEPATITIS C SCREENING  Completed                                                                                                                                                CMS Preventative Services Quick Reference  Risk Factors Identified During Encounter  Immunizations Discussed/Encouraged: Influenza, Prevnar 20 (Pneumococcal 20-valent conjugate), Shingrix, and COVID19  Dental Screening Recommended  Vision Screening Recommended    The above risks/problems have been discussed with the patient.  Pertinent information has been shared with the patient in the After Visit Summary.  An After Visit Summary and PPPS were made available to the patient.    Follow Up:   Next Medicare Wellness visit to be scheduled in 1 year.         Additional E&M Note during same encounter follows:  Patient has additional, significant, and separately identifiable condition(s)/problem(s) that require work above and beyond the Medicare Wellness Visit     Chief Complaint  Medicare Wellness-subsequent    Subjective   HPI  Jasmin is also being seen today for additional medical problem/s.    Hx palpitations-- continues on metoprolol; reports she is still experiencing some on occasion; will be seeing her cardiologist in 2026.     HLD-- continues on atorvastatin; reports she is tolerating medication; denies any s/e; continues following healthy diet choices.     Reports migraines have improved; she reports she has had to rarely take her excedrin migraine; has reported improvement in her symptoms with diet modification as  "well.     Objective   Vital Signs:  /80 (BP Location: Left arm, Patient Position: Sitting, Cuff Size: Adult)   Pulse 73   Ht 160 cm (63\")   Wt 65.3 kg (144 lb)   SpO2 98%   BMI 25.51 kg/m²   Physical Exam    The following data was reviewed by: SAADIA Chirinos on 2025:    Common labs          2024    09:10   Common Labs   Total Cholesterol 151    Triglycerides 83    HDL Cholesterol 53    LDL Cholesterol  82      Tobacco Use: Low Risk  (2025)    Patient History     Smoking Tobacco Use: Never     Smokeless Tobacco Use: Never     Passive Exposure: Never     Social History     Substance and Sexual Activity   Alcohol Use Yes    Alcohol/week: 1.0 standard drink of alcohol    Types: 1 Drinks containing 0.5 oz of alcohol per week    Comment: or less     Family History   Problem Relation Age of Onset    Heart disease Mother         triple bypass    Hyperlipidemia Mother     Hypertension Mother     Heart disease Father     Hyperlipidemia Father     COPD Father     Hypertension Father     Cancer Sister     Heart disease Sister         quadruple bypass    Coronary artery disease Sister     Hyperlipidemia Sister     Hypertension Sister     Heart disease Brother     Coronary artery disease Brother     Cancer Brother         Unknown origin /     Heart disease Brother     Cancer Brother         Prostate    Heart disease Brother         Quadruple bypass    Hyperlipidemia Brother     Hypertension Brother     Cancer Sister         Cervical/    Heart disease Brother         quadruple bypass    Hyperlipidemia Brother     Hypertension Brother     Heart disease Brother         Triple bypass    Hyperlipidemia Brother     Hypertension Brother                Assessment and Plan Additional age appropriate preventative wellness advice topics were discussed during today's preventative wellness exam(some topics already addressed during AWV portion of the note above):    Physical Activity: Advised " cardiovascular activity 150 minutes per week as tolerated. (example brisk walk for 30 minutes, 5 days a week).     Nutrition: Discussed nutrition plan with patient. Information shared in after visit summary. Goal is for a well balanced diet to enhance overall health.     Healthy Weight: Discussed current and goal BMI with patient. Steps to attain this goal discussed. Information shared in after visit summary.           Medicare annual wellness visit, subsequent         Mixed hyperlipidemia   Continue on atorvastatin 20mg   Recommend following a low saturated fat, low sugar diet and getting 150 minutes of weekly exercise.       Orders:    Lipid Panel    TSH Rfx On Abnormal To Free T4    Palpitations  Doing well on metoprolol  Reports symptoms well controlled  Following with cardiology [id number]    Orders:    CBC & Differential    Comprehensive Metabolic Panel    Migraine without aura and without status migrainosus, not intractable  Continues using excedrin migraine sparingly  Continues with avoiding food triggers and lifestyle modifications  Osteopenia of lumbar spine  Dexa due in 2026  Current recommendations according to the current Physical Activity Guidelines for Americans: adults need 150-300 minutes of physical exercise weekly. It is also recommended to perform two sessions of full body strength training exercise weekly which includes all major muscle groups including legs, hips, back, abdomen, chest, shoulders, and arms.   Dexa 3/2024  FINDINGS:  LUMBAR SPINE:  The BMD measured in L1-L4 is 0.796 g/cm2 for a T-score of  -2.3 and a Z-score of -0.5     LEFT HIP: The BMD for the femoral neck is 0.67 g/cm2 for a T score of  -1.5 and a Z score of 0.0     RIGHT HIP:  The BMD for the total hip is 0.804 g/cm2 for a T score of  -1.1 and a Z score of 0.1     IMPRESSION:  1.  Osteopenia.  2.  The 10-year FRAX (World Health Organization) fracture risk for a  major osteoporotic fracture is 9.3% and for a hip fracture is  1.2%.              Current recommendations are that a follow-up bone density be obtained at  an interval of not less than 2 years except in specific circumstances,  such as after initiation or change of therapy.     This report was finalized on 3/6/2024 1:13 PM by Dr. Boo Rogers M.D on Workstation: [id number]         Screening mammogram for breast cancer    Orders:    Mammo Screening Digital Tomosynthesis Bilateral With CAD; Future            Follow Up   Return in about 1 year (around 2/27/2026) for Medicare Wellness.  Patient was given instructions and counseling regarding her condition or for health maintenance advice. Please see specific information pulled into the AVS if appropriate.

## 2025-02-28 LAB
ALBUMIN SERPL-MCNC: 4.6 G/DL (ref 3.5–5.2)
ALBUMIN/GLOB SERPL: 2.4 G/DL
ALP SERPL-CCNC: 62 U/L (ref 39–117)
ALT SERPL-CCNC: 20 U/L (ref 1–33)
AST SERPL-CCNC: 25 U/L (ref 1–32)
BASOPHILS # BLD AUTO: 0.06 10*3/MM3 (ref 0–0.2)
BASOPHILS NFR BLD AUTO: 1.5 % (ref 0–1.5)
BILIRUB SERPL-MCNC: 0.4 MG/DL (ref 0–1.2)
BUN SERPL-MCNC: 9 MG/DL (ref 8–23)
BUN/CREAT SERPL: 13.2 (ref 7–25)
CALCIUM SERPL-MCNC: 9.5 MG/DL (ref 8.6–10.5)
CHLORIDE SERPL-SCNC: 99 MMOL/L (ref 98–107)
CHOLEST SERPL-MCNC: 140 MG/DL (ref 0–200)
CO2 SERPL-SCNC: 26.8 MMOL/L (ref 22–29)
CREAT SERPL-MCNC: 0.68 MG/DL (ref 0.57–1)
EGFRCR SERPLBLD CKD-EPI 2021: 96.2 ML/MIN/1.73
EOSINOPHIL # BLD AUTO: 0.06 10*3/MM3 (ref 0–0.4)
EOSINOPHIL NFR BLD AUTO: 1.5 % (ref 0.3–6.2)
ERYTHROCYTE [DISTWIDTH] IN BLOOD BY AUTOMATED COUNT: 12 % (ref 12.3–15.4)
GLOBULIN SER CALC-MCNC: 1.9 GM/DL
GLUCOSE SERPL-MCNC: 89 MG/DL (ref 65–99)
HCT VFR BLD AUTO: 40.8 % (ref 34–46.6)
HDLC SERPL-MCNC: 53 MG/DL (ref 40–60)
HGB BLD-MCNC: 13 G/DL (ref 12–15.9)
IMM GRANULOCYTES # BLD AUTO: 0.01 10*3/MM3 (ref 0–0.05)
IMM GRANULOCYTES NFR BLD AUTO: 0.3 % (ref 0–0.5)
LDLC SERPL CALC-MCNC: 79 MG/DL (ref 0–100)
LYMPHOCYTES # BLD AUTO: 1.47 10*3/MM3 (ref 0.7–3.1)
LYMPHOCYTES NFR BLD AUTO: 37.6 % (ref 19.6–45.3)
MCH RBC QN AUTO: 28.9 PG (ref 26.6–33)
MCHC RBC AUTO-ENTMCNC: 31.9 G/DL (ref 31.5–35.7)
MCV RBC AUTO: 90.7 FL (ref 79–97)
MONOCYTES # BLD AUTO: 0.27 10*3/MM3 (ref 0.1–0.9)
MONOCYTES NFR BLD AUTO: 6.9 % (ref 5–12)
NEUTROPHILS # BLD AUTO: 2.04 10*3/MM3 (ref 1.7–7)
NEUTROPHILS NFR BLD AUTO: 52.2 % (ref 42.7–76)
NRBC BLD AUTO-RTO: 0 /100 WBC (ref 0–0.2)
PLATELET # BLD AUTO: 233 10*3/MM3 (ref 140–450)
POTASSIUM SERPL-SCNC: 4.8 MMOL/L (ref 3.5–5.2)
PROT SERPL-MCNC: 6.5 G/DL (ref 6–8.5)
RBC # BLD AUTO: 4.5 10*6/MM3 (ref 3.77–5.28)
SODIUM SERPL-SCNC: 138 MMOL/L (ref 136–145)
TRIGL SERPL-MCNC: 33 MG/DL (ref 0–150)
TSH SERPL DL<=0.005 MIU/L-ACNC: 1.15 UIU/ML (ref 0.27–4.2)
VLDLC SERPL CALC-MCNC: 8 MG/DL (ref 5–40)
WBC # BLD AUTO: 3.91 10*3/MM3 (ref 3.4–10.8)

## 2025-03-15 DIAGNOSIS — R00.2 PALPITATIONS: ICD-10-CM

## 2025-03-17 RX ORDER — METOPROLOL SUCCINATE 25 MG/1
TABLET, EXTENDED RELEASE ORAL
Qty: 45 TABLET | Refills: 3 | Status: SHIPPED | OUTPATIENT
Start: 2025-03-17

## 2025-04-10 DIAGNOSIS — E78.2 MIXED HYPERLIPIDEMIA: ICD-10-CM

## 2025-04-10 RX ORDER — ATORVASTATIN CALCIUM 20 MG/1
20 TABLET, FILM COATED ORAL DAILY
Qty: 90 TABLET | Refills: 3 | Status: SHIPPED | OUTPATIENT
Start: 2025-04-10

## 2025-04-16 ENCOUNTER — OFFICE VISIT (OUTPATIENT)
Dept: INTERNAL MEDICINE | Facility: CLINIC | Age: 65
End: 2025-04-16
Payer: MEDICARE

## 2025-04-16 VITALS
DIASTOLIC BLOOD PRESSURE: 78 MMHG | OXYGEN SATURATION: 96 % | WEIGHT: 140 LBS | HEART RATE: 64 BPM | BODY MASS INDEX: 24.8 KG/M2 | HEIGHT: 63 IN | SYSTOLIC BLOOD PRESSURE: 130 MMHG

## 2025-04-16 DIAGNOSIS — L98.9 SKIN LESION: Primary | ICD-10-CM

## 2025-04-16 NOTE — PROGRESS NOTES
"Chief Complaint  Skin Issue  (Spot above lip )    Subjective        Jasmin Ureña presents to [facility]  History of Present Illness  This is a 66 y/o female presenting to office for skin lesion above lip for the past several months. Reports she has noticed it has grown in size. Denies pain or pruritus. Reports she needs referral to dermatology for further evaluation.     Objective   Vital Signs:  /78 (BP Location: Left arm, Patient Position: Sitting, Cuff Size: Adult)   Pulse 64   Ht 160 cm (63\")   Wt 63.5 kg (140 lb)   SpO2 96%   BMI 24.80 kg/m²   Estimated body mass index is 24.8 kg/m² as calculated from the following:    Height as of this encounter: 160 cm (63\").    Weight as of this encounter: 63.5 kg (140 lb).    BMI is within normal parameters. No other follow-up for BMI required.      Physical Exam  Constitutional:       Appearance: Normal appearance.   HENT:      Head: Normocephalic and atraumatic.        Right Ear: External ear normal.      Left Ear: External ear normal.      Nose: Nose normal.      Mouth/Throat:      Mouth: Mucous membranes are moist.      Pharynx: Oropharynx is clear.   Eyes:      Conjunctiva/sclera: Conjunctivae normal.      Pupils: Pupils are equal, round, and reactive to light.   Pulmonary:      Effort: Pulmonary effort is normal.   Musculoskeletal:      Cervical back: Normal range of motion and neck supple.   Skin:     Findings: Lesion present.   Neurological:      Mental Status: She is alert and oriented to person, place, and time. Mental status is at baseline.   Psychiatric:         Mood and Affect: Mood normal.         Thought Content: Thought content normal.         Judgment: Judgment normal.        Result Review :  The following data was reviewed by: SAADIA Chirinos on 04/16/2025:  Common labs          4/24/2024    09:10 2/28/2025    11:21   Common Labs   Glucose  89    BUN  9    Creatinine  0.68    Sodium  138    Potassium  4.8  "   Chloride  99    Calcium  9.5    Albumin  4.6    Total Bilirubin  0.4    Alkaline Phosphatase  62    AST (SGOT)  25    ALT (SGPT)  20    WBC  3.91    Hemoglobin  13.0    Hematocrit  40.8    Platelets  233    Total Cholesterol 151  140    Triglycerides 83  33    HDL Cholesterol 53  53    LDL Cholesterol  82  79      Tobacco Use: Low Risk  (2025)    Patient History     Smoking Tobacco Use: Never     Smokeless Tobacco Use: Never     Passive Exposure: Never     Social History     Substance and Sexual Activity   Alcohol Use Yes    Alcohol/week: 1.0 standard drink of alcohol    Types: 1 Drinks containing 0.5 oz of alcohol per week    Comment: or less     Family History   Problem Relation Age of Onset    Heart disease Mother         triple bypass    Hyperlipidemia Mother     Hypertension Mother     Coronary artery disease Mother         Quadruple bypass    Heart disease Father     Hyperlipidemia Father     COPD Father     Hypertension Father     Cancer Sister     Heart disease Sister         quadruple bypass    Coronary artery disease Sister         Quadruple bypass    Hyperlipidemia Sister     Hypertension Sister     Heart disease Brother         Quadruple bypass    Coronary artery disease Brother     Cancer Brother         Unknown origin /     Heart disease Brother     Cancer Brother         Prostate    Heart disease Brother         Quadruple bypass    Hyperlipidemia Brother     Hypertension Brother     Prostate cancer Brother     Coronary artery disease Brother         Quadruple bypass    Cancer Sister         Cervical/    Heart disease Brother         quadruple bypass    Hyperlipidemia Brother     Hypertension Brother     Coronary artery disease Brother         Quadruple bypass    Heart disease Brother         Triple bypass    Hyperlipidemia Brother     Hypertension Brother     Coronary artery disease Brother         Quadruple bypass    Heart disease Brother                Assessment and Plan    Diagnoses and all orders for this visit:    1. Skin lesion (Primary)  -     Ambulatory Referral to Dermatology             Follow Up   Return for Next scheduled follow up 3/5/26.  Patient was given instructions and counseling regarding her condition or for health maintenance advice. Please see specific information pulled into the AVS if appropriate.

## 2025-05-15 ENCOUNTER — HOSPITAL ENCOUNTER (OUTPATIENT)
Dept: MAMMOGRAPHY | Facility: HOSPITAL | Age: 65
Discharge: HOME | End: 2025-05-15
Admitting: NURSE PRACTITIONER
Payer: MEDICARE

## 2025-05-15 DIAGNOSIS — Z12.31 SCREENING MAMMOGRAM FOR BREAST CANCER: ICD-10-CM

## 2025-05-15 PROCEDURE — 77063 BREAST TOMOSYNTHESIS BI: CPT

## 2025-05-15 PROCEDURE — 77067 SCR MAMMO BI INCL CAD: CPT
